# Patient Record
Sex: MALE | Race: OTHER | HISPANIC OR LATINO
[De-identification: names, ages, dates, MRNs, and addresses within clinical notes are randomized per-mention and may not be internally consistent; named-entity substitution may affect disease eponyms.]

---

## 2020-09-01 ENCOUNTER — TRANSCRIPTION ENCOUNTER (OUTPATIENT)
Age: 70
End: 2020-09-01

## 2020-12-29 ENCOUNTER — OUTPATIENT (OUTPATIENT)
Dept: OUTPATIENT SERVICES | Facility: HOSPITAL | Age: 70
LOS: 1 days | End: 2020-12-29
Payer: MEDICARE

## 2020-12-29 DIAGNOSIS — Z20.828 CONTACT WITH AND (SUSPECTED) EXPOSURE TO OTHER VIRAL COMMUNICABLE DISEASES: ICD-10-CM

## 2020-12-29 LAB — SARS-COV-2 RNA SPEC QL NAA+PROBE: SIGNIFICANT CHANGE UP

## 2020-12-29 PROCEDURE — C9803: CPT

## 2020-12-29 PROCEDURE — U0003: CPT

## 2020-12-30 DIAGNOSIS — Z20.828 CONTACT WITH AND (SUSPECTED) EXPOSURE TO OTHER VIRAL COMMUNICABLE DISEASES: ICD-10-CM

## 2021-06-24 ENCOUNTER — NON-APPOINTMENT (OUTPATIENT)
Age: 71
End: 2021-06-24

## 2021-06-24 ENCOUNTER — APPOINTMENT (OUTPATIENT)
Dept: ORTHOPEDIC SURGERY | Facility: CLINIC | Age: 71
End: 2021-06-24
Payer: MEDICARE

## 2021-06-24 VITALS
DIASTOLIC BLOOD PRESSURE: 96 MMHG | HEIGHT: 68 IN | BODY MASS INDEX: 25.76 KG/M2 | HEART RATE: 88 BPM | SYSTOLIC BLOOD PRESSURE: 194 MMHG | WEIGHT: 170 LBS

## 2021-06-24 DIAGNOSIS — Z78.9 OTHER SPECIFIED HEALTH STATUS: ICD-10-CM

## 2021-06-24 DIAGNOSIS — Z82.49 FAMILY HISTORY OF ISCHEMIC HEART DISEASE AND OTHER DISEASES OF THE CIRCULATORY SYSTEM: ICD-10-CM

## 2021-06-24 PROCEDURE — 73560 X-RAY EXAM OF KNEE 1 OR 2: CPT | Mod: 50

## 2021-06-24 PROCEDURE — 99203 OFFICE O/P NEW LOW 30 MIN: CPT

## 2021-07-01 NOTE — PHYSICAL EXAM
[de-identified] : Right Knee: Range of Motion in Degrees	\par 	                  Claimant:	Normal:	\par Flexion Active	  90 	                135-degrees	\par Flexion Passive	  90	                135-degrees	\par Extension Active	  10	                0-5-degrees	\par Extension Passive	  10	                0-5-degrees	\par \par No weakness to flexion/extension.  No evidence of instability in the AP plane or varus or valgus stress.  Negative  Lachman.  Negative pivot shift.  Negative anterior drawer test.  Negative posterior drawer test.  Negative Ita.  Negative Apley grind.  No medial or lateral joint line tenderness.  Positive tenderness over the medial and lateral facet of the patella.  Positive patellofemoral crepitations.  No lateral tilting patella.  No patella apprehension.  Positive crepitation in the medial and lateral femoral condyle.  No proximal or distal swelling, edema or tenderness.  No gross motor or sensory deficits.  Mild intra-articular swelling.  2+ DP and PT pulses.  No varus or valgus malalignment.  Skin is intact.  No rashes, scars or lesions.  \par \par Left Knee: Range of Motion in Degrees	\par 	                  Claimant:	Normal:	\par Flexion Active	  90	                135-degrees	\par Flexion Passive	  90	                135-degrees	\par Extension Active	  10	                0-5-degrees	\par Extension Passive	  10	                0-5-degrees	\par \par No weakness to flexion/extension.  No evidence of instability in the AP plane or varus or valgus stress.  Negative  Lachman.  Negative pivot shift.  Negative anterior drawer test.  Negative posterior drawer test.  Negative Ita.  Negative Apley grind.  No medial or lateral joint line tenderness.  Positive tenderness over the medial and lateral facet of the patella.  Positive patellofemoral crepitations.  No lateral tilting patella.  No patella apprehension.  Positive crepitation in the medial and lateral femoral condyle.  No proximal or distal swelling, edema or tenderness.  No gross motor or sensory deficits.  Mild intra-articular swelling.  2+ DP and PT pulses.  No varus or valgus malalignment.  Skin is intact.  No rashes, scars or lesions. \par  [de-identified] : Gait and Station:  Ambulating with a slightly antalgic to antalgic gait.  Normal Station.  [de-identified] : Appearance:  Well developed, well-nourished male in no acute distress.\par   [de-identified] : Radiographs, one to two views of the right knee, show severe arthritis.\par Radiographs, one to two views of the left knee, show severe arthritis.\par Radiograph, one view AP standing of bilateral knees, shows severe arthritis.\par

## 2021-07-01 NOTE — DISCUSSION/SUMMARY
[de-identified] : At this time, due to osteoarthritis of bilateral knees, I recommended to start a course of physical therapy, topical anti-inflammatory cream and reassessment in 4-6 weeks.\par

## 2021-07-01 NOTE — HISTORY OF PRESENT ILLNESS
[8] : a current pain level of 8/10 [de-identified] : The patient comes in today with complaints of pain to his bilateral knees (right worse than left).  He states it has been going on for quite some time and he has not had any treatment.  The patient states the onset/injury occurred on 11/12/2020.  This injury is not work related or due to an automobile accident.  The patient states the pain is intermittent and constant. The patient describes the pain as sharp. [de-identified] : Bending and lying down [de-identified] : Rest and ice

## 2021-07-01 NOTE — ADDENDUM
[FreeTextEntry1] : This note was written by Selvin Reyez on 06/29/2021, acting as a scribe for Cipriano Lebron III, MD

## 2021-07-22 ENCOUNTER — APPOINTMENT (OUTPATIENT)
Dept: ORTHOPEDIC SURGERY | Facility: CLINIC | Age: 71
End: 2021-07-22
Payer: MEDICARE

## 2021-07-22 VITALS
WEIGHT: 170 LBS | HEIGHT: 68 IN | BODY MASS INDEX: 25.76 KG/M2 | SYSTOLIC BLOOD PRESSURE: 167 MMHG | DIASTOLIC BLOOD PRESSURE: 91 MMHG | HEART RATE: 76 BPM

## 2021-07-22 PROCEDURE — 20610 DRAIN/INJ JOINT/BURSA W/O US: CPT | Mod: 50

## 2021-07-26 NOTE — PROCEDURE
[de-identified] : Consent: \par At this time, I have recommended an injection to the right and left knee.  The risks and benefits of the procedure were discussed with the patient in detail.  Upon verbal consent of the patient, we proceeded with the injection as noted below.  \par \par Procedure:  \par After a sterile prep, the patient underwent an injection of 9 cc of 1% Lidocaine without epinephrine and 1 cc of Kenalog into the right and left knee.  The patient tolerated the procedure well.  There were no complications.  \par

## 2021-07-26 NOTE — DISCUSSION/SUMMARY
[de-identified] : At this time, due to osteoarthritis of bilateral knees, I recommend ice and elevation.  He will be reassessed in three to four weeks.\par \par The patient has been advised that their blood pressure today was outside normal ranges and the patient was instructed accordingly. Our Blood Pressure Monitoring Sheet with instructions was given to the patient.\par

## 2021-07-26 NOTE — PHYSICAL EXAM
[de-identified] : Right Knee: \par Range of Motion in Degrees	\par 	  Claimant:	Normal:	\par Flexion Active	 90 	 135-degrees	\par Flexion Passive	 90	 135-degrees	\par Extension Active	 10	 0-5-degrees	\par Extension Passive	 10	 0-5-degrees	\par \par No weakness to flexion/extension. No evidence of instability in the AP plane or varus or valgus stress. Negative Lachman. Negative pivot shift. Negative anterior drawer test. Negative posterior drawer test. Negative Ita. Negative Apley grind. No medial or lateral joint line tenderness. Positive tenderness over the medial and lateral facet of the patella. Positive patellofemoral crepitations. No lateral tilting patella. No patella apprehension. Positive crepitation in the medial and lateral femoral condyle. No proximal or distal swelling, edema or tenderness. No gross motor or sensory deficits. Mild intra-articular swelling. 2+ DP and PT pulses. No varus or valgus malalignment. Skin is intact. No rashes, scars or lesions. \par \par Left Knee: \par Range of Motion in Degrees	\par 	  Claimant:	Normal:	\par Flexion Active	 90	 135-degrees	\par Flexion Passive	 90	 135-degrees	\par Extension Active	 10	 0-5-degrees	\par Extension Passive	 10	 0-5-degrees	\par \par No weakness to flexion/extension. No evidence of instability in the AP plane or varus or valgus stress. Negative Lachman. Negative pivot shift. Negative anterior drawer test. Negative posterior drawer test. Negative Ita. Negative Apley grind. No medial or lateral joint line tenderness. Positive tenderness over the medial and lateral facet of the patella. Positive patellofemoral crepitations. No lateral tilting patella. No patella apprehension. Positive crepitation in the medial and lateral femoral condyle. No proximal or distal swelling, edema or tenderness. No gross motor or sensory deficits. Mild intra-articular swelling. 2+ DP and PT pulses. No varus or valgus malalignment. Skin is intact. No rashes, scars or lesions. \par  [de-identified] : Ambulating with a slightly antalgic to antalgic gait.  Station:  Normal.  [de-identified] : Appearance:  Well-developed, well-nourished male in no acute distress.\par   [de-identified] : R

## 2021-07-26 NOTE — ADDENDUM
[FreeTextEntry1] : This note was written by Charlee Renee on 07/26/2021 acting as a scribe for BINDU BARONE III, MD

## 2021-08-12 ENCOUNTER — APPOINTMENT (OUTPATIENT)
Dept: ORTHOPEDIC SURGERY | Facility: CLINIC | Age: 71
End: 2021-08-12
Payer: MEDICARE

## 2021-08-12 VITALS
DIASTOLIC BLOOD PRESSURE: 86 MMHG | BODY MASS INDEX: 25.76 KG/M2 | HEIGHT: 68 IN | SYSTOLIC BLOOD PRESSURE: 156 MMHG | HEART RATE: 91 BPM | WEIGHT: 170 LBS

## 2021-08-12 PROCEDURE — 99212 OFFICE O/P EST SF 10 MIN: CPT

## 2021-08-13 ENCOUNTER — APPOINTMENT (OUTPATIENT)
Dept: INTERNAL MEDICINE | Facility: CLINIC | Age: 71
End: 2021-08-13

## 2021-08-15 ENCOUNTER — APPOINTMENT (OUTPATIENT)
Dept: DISASTER EMERGENCY | Facility: CLINIC | Age: 71
End: 2021-08-15

## 2021-08-15 DIAGNOSIS — Z01.818 ENCOUNTER FOR OTHER PREPROCEDURAL EXAMINATION: ICD-10-CM

## 2021-08-16 LAB — SARS-COV-2 N GENE NPH QL NAA+PROBE: NOT DETECTED

## 2021-08-17 NOTE — CHART NOTE - NSCHARTNOTEFT_GEN_A_CORE
Preop Phone Call:  - Able to Reach Patient:  yes  - Info given to: Tenisha granddaughter of patient having cardiac catheterization  -  and allergies confirmed: yes  - Medication Information Given: yes  - Medications To Take (specify) aspirin/ Amlodipine Ok to take a.m. meds w/sip of water  - Medications To Hold (Specify) N/A 	  - Arrival Time: 12:30pm  - NPO after: light breakfast 	  - Understanding of Information Verbalized: yes  will have a  over the age of 18  for d/c home

## 2021-08-17 NOTE — H&P ADULT - ASSESSMENT
70yr old male PMH HTN, arthritis went to his PMD for knee pain there he was found to be significantly hypertensive. Pt. was referred to cardiologist where a stress test revealed moderately sized perfusion abnormality of moderate severity present in the mid inferior and apical regions. Pt. now referred for C for further evaluation.    C risks, benefits and alternatives discussed with patient. Risk discussed included, but not limited to MI, stroke, mortality, major bleeding, arrythmia, or infection. Consent obtained and signed educational material provided. Pt. verbalizes and understands pre-procedural instructions.  ASA: II  Bleeding Risk Score: 1.4  Creatinine: .86  GFR: 102

## 2021-08-17 NOTE — H&P ADULT - HISTORY OF PRESENT ILLNESS
70yr old male PMH HTN, arthritis  70yr old male PMH HTN, arthritis went to his PMD for knee pain there he was found to be significantly hypertensive. Pt. was referred to cardiologist where a stress test revealed moderately sized perfusion abnormality of moderate severity present in the mid inferior and apical regions. Pt. now referred for [facility] for further evaluation.

## 2021-08-18 ENCOUNTER — OUTPATIENT (OUTPATIENT)
Dept: OUTPATIENT SERVICES | Facility: HOSPITAL | Age: 71
LOS: 1 days | Discharge: HOME | End: 2021-08-18
Payer: MEDICARE

## 2021-08-18 VITALS
OXYGEN SATURATION: 97 % | DIASTOLIC BLOOD PRESSURE: 80 MMHG | HEART RATE: 65 BPM | SYSTOLIC BLOOD PRESSURE: 136 MMHG | RESPIRATION RATE: 16 BRPM

## 2021-08-18 VITALS
RESPIRATION RATE: 16 BRPM | HEIGHT: 78 IN | SYSTOLIC BLOOD PRESSURE: 159 MMHG | DIASTOLIC BLOOD PRESSURE: 89 MMHG | TEMPERATURE: 98 F | WEIGHT: 203.05 LBS | OXYGEN SATURATION: 100 % | HEART RATE: 72 BPM

## 2021-08-18 DIAGNOSIS — I10 ESSENTIAL (PRIMARY) HYPERTENSION: ICD-10-CM

## 2021-08-18 DIAGNOSIS — M19.90 UNSPECIFIED OSTEOARTHRITIS, UNSPECIFIED SITE: ICD-10-CM

## 2021-08-18 DIAGNOSIS — R94.39 ABNORMAL RESULT OF OTHER CARDIOVASCULAR FUNCTION STUDY: ICD-10-CM

## 2021-08-18 DIAGNOSIS — I25.10 ATHEROSCLEROTIC HEART DISEASE OF NATIVE CORONARY ARTERY WITHOUT ANGINA PECTORIS: ICD-10-CM

## 2021-08-18 PROCEDURE — 93458 L HRT ARTERY/VENTRICLE ANGIO: CPT

## 2021-08-18 PROCEDURE — C1887: CPT

## 2021-08-18 PROCEDURE — C1894: CPT

## 2021-08-18 PROCEDURE — 93005 ELECTROCARDIOGRAM TRACING: CPT

## 2021-08-18 PROCEDURE — 99152 MOD SED SAME PHYS/QHP 5/>YRS: CPT

## 2021-08-18 PROCEDURE — C1769: CPT

## 2021-08-18 PROCEDURE — 93567 NJX CAR CTH SPRVLV AORTGRPHY: CPT

## 2021-08-18 PROCEDURE — 99153 MOD SED SAME PHYS/QHP EA: CPT

## 2021-08-18 NOTE — PACU DISCHARGE NOTE - COMMENTS
pt to be d/julia to home , vss, iv d/julia , pt without c/o discomfort .d/c teaching done with teachback

## 2021-08-18 NOTE — PROGRESS NOTE ADULT - SUBJECTIVE AND OBJECTIVE BOX
s/p LHC revealing non obstructive CAD    Denies chest pain, shortness of breath, dizziness or palpitations at this time  A+Ox4  CV:S1S2 reg  Respiratory: CTAB  Right radial hemoband removed.  Procedure site CDI, no bleeding, no hematoma, able to move all digits with capillary refill < 3 seconds, fingers warm    T(C): 36.8 (18 Aug 2021 12:57), Max: 36.8 (18 Aug 2021 12:57)  T(F): 98.3 (18 Aug 2021 12:57), Max: 98.3 (18 Aug 2021 12:57)  HR: 65 (18 Aug 2021 16:10) (62 - 72)  BP: 136/80 (18 Aug 2021 16:10) (134/81 - 159/89)  BP(mean): --  RR: 16 (18 Aug 2021 16:10) (16 - 16)  SpO2: 97% (18 Aug 2021 16:10) (97% - 100%)      HPI:  70yr old male PMH HTN, arthritis went to his PMD for knee pain there he was found to be significantly hypertensive. Pt. was referred to cardiologist where a stress test revealed moderately sized perfusion abnormality of moderate severity present in the mid inferior and apical regions. Pt. now referred for C for further evaluation.   (17 Aug 2021 15:33)    s/p LHC revealing non obstructive CAD    --vital signs, diet and activity per post procedure orders  -ambulate ad darby post bedrest  -encourage PO fluids  -plan of care discussed with patient and MD  -d/c after bedrest if stable  -post procedure and d/c instructions reviewed  -follow up with MD in 1-2 weeks  -Discussed therapeutic lifestyle changes to reduce risk factors such as following a cardiac diet, weight loss, maintaining a healthy weight, exercise, smoking cessation, medication compliance, and regular follow-up  with MD

## 2021-08-26 NOTE — DISCUSSION/SUMMARY
[de-identified] : At this time, he was instructed on home therapeutic modalities for the left and right knee osteoarthritis.  He will follow up in four to six weeks.

## 2021-08-26 NOTE — PHYSICAL EXAM
[de-identified] : Right Knee: \par Range of Motion in Degrees	\par 	                  Claimant:	Normal:	\par Flexion Active	  135 	                135-degrees	\par Flexion Passive	  135	                135-degrees	\par Extension Active	  0-5	                0-5-degrees	\par Extension Passive	  0-5	                0-5-degrees	\par \par No weakness to flexion/extension. No evidence of instability in the AP plane or varus or valgus stress.  Negative  Lachman.  Negative pivot shift.  Negative anterior drawer test.  Negative posterior drawer test.  Negative Ita.  Negative Apley grind.  No medial or lateral joint line tenderness.  Positive tenderness over the medial and lateral facet of the patella.  Positive patellofemoral crepitations.  No lateral tilting patella.  No patella apprehension.  Positive crepitation in the medial and lateral femoral condyle.  No proximal or distal swelling, edema or tenderness.  No gross motor or sensory deficits. Mild intra-articular swelling.  2+ DP and PT pulses. No varus or valgus malalignment.  Skin is intact.  No rashes, scars or lesions. \par \par Left Knee: \par Range of Motion in Degrees	\par 	                  Claimant:	Normal:	\par Flexion Active	  135 	                135-degrees	\par Flexion Passive	  135	                135-degrees	\par Extension Active	  0-5	                0-5-degrees	\par Extension Passive	  0-5	                0-5-degrees	\par \par No weakness to flexion/extension. No evidence of instability in the AP plane or varus or valgus stress.  Negative  Lachman.  Negative pivot shift.  Negative anterior drawer test.  Negative posterior drawer test.  Negative Ita.  Negative Apley grind.  No medial or lateral joint line tenderness.  Positive tenderness over the medial and lateral facet of the patella.  Positive patellofemoral crepitations.  No lateral tilting patella.  No patella apprehension.  Positive crepitation in the medial and lateral femoral condyle.  No proximal or distal swelling, edema or tenderness.  No gross motor or sensory deficits. Mild intra-articular swelling.  2+ DP and PT pulses. No varus or valgus malalignment.  Skin is intact.  No rashes, scars or lesions. \par  [de-identified] : Ambulating with a normal gait.  Station:  Normal.  [de-identified] : General Appearance:  Well-developed, well-nourished male in no acute distress.

## 2021-08-26 NOTE — ADDENDUM
[FreeTextEntry1] : This note was written by Bri Lester on 08/20/2021 acting as scribe for Cipriano Lebron III, MD

## 2021-09-16 ENCOUNTER — APPOINTMENT (OUTPATIENT)
Dept: ORTHOPEDIC SURGERY | Facility: CLINIC | Age: 71
End: 2021-09-16
Payer: MEDICARE

## 2021-09-16 PROCEDURE — 99441: CPT

## 2021-10-26 PROBLEM — I10 ESSENTIAL (PRIMARY) HYPERTENSION: Chronic | Status: ACTIVE | Noted: 2021-08-17

## 2021-10-26 PROBLEM — M19.90 UNSPECIFIED OSTEOARTHRITIS, UNSPECIFIED SITE: Chronic | Status: ACTIVE | Noted: 2021-08-17

## 2021-11-02 ENCOUNTER — APPOINTMENT (OUTPATIENT)
Dept: UROLOGY | Facility: CLINIC | Age: 71
End: 2021-11-02
Payer: MEDICARE

## 2021-11-02 PROCEDURE — 99204 OFFICE O/P NEW MOD 45 MIN: CPT

## 2021-11-02 NOTE — PHYSICAL EXAM
[General Appearance - Well Developed] : well developed [General Appearance - Well Nourished] : well nourished [Normal Appearance] : normal appearance [Well Groomed] : well groomed [General Appearance - In No Acute Distress] : no acute distress [Edema] : no peripheral edema [Respiration, Rhythm And Depth] : normal respiratory rhythm and effort [Exaggerated Use Of Accessory Muscles For Inspiration] : no accessory muscle use [Abdomen Soft] : soft [Abdomen Tenderness] : non-tender [Costovertebral Angle Tenderness] : no ~M costovertebral angle tenderness [Urethral Meatus] : meatus normal [Urinary Bladder Findings] : the bladder was normal on palpation [Scrotum] : the scrotum was normal [Testes Mass (___cm)] : there were no testicular masses [No Prostate Nodules] : no prostate nodules [FreeTextEntry1] : The prostate gland is palpably benign and moderate in size [Normal Station and Gait] : the gait and station were normal for the patient's age [] : no rash [No Focal Deficits] : no focal deficits [Oriented To Time, Place, And Person] : oriented to person, place, and time [Affect] : the affect was normal [Mood] : the mood was normal [Not Anxious] : not anxious [No Palpable Adenopathy] : no palpable adenopathy

## 2021-11-02 NOTE — END OF VISIT
[FreeTextEntry3] : He will take a course of antibiotics followed by a repeat PSA and transrectal ultrasound of the prostate gland.

## 2021-11-02 NOTE — HISTORY OF PRESENT ILLNESS
[FreeTextEntry1] : This patient presents for evaluation of a PSA of over 19.  He has some minor urinary complaints but otherwise has been free of genitourinary problems in the past.  His daughter is with him

## 2021-11-23 ENCOUNTER — APPOINTMENT (OUTPATIENT)
Dept: UROLOGY | Facility: CLINIC | Age: 71
End: 2021-11-23
Payer: MEDICARE

## 2021-11-23 VITALS
DIASTOLIC BLOOD PRESSURE: 79 MMHG | OXYGEN SATURATION: 97 % | WEIGHT: 185 LBS | BODY MASS INDEX: 29.73 KG/M2 | HEIGHT: 66 IN | HEART RATE: 91 BPM | SYSTOLIC BLOOD PRESSURE: 169 MMHG

## 2021-11-23 DIAGNOSIS — N40.0 BENIGN PROSTATIC HYPERPLASIA WITHOUT LOWER URINARY TRACT SYMPMS: ICD-10-CM

## 2021-11-23 PROCEDURE — 76872 US TRANSRECTAL: CPT

## 2021-11-26 LAB — PSA SERPL-MCNC: 22.1 NG/ML

## 2021-12-17 LAB
ANION GAP SERPL CALC-SCNC: 13 MMOL/L
BUN SERPL-MCNC: 14 MG/DL
CALCIUM SERPL-MCNC: 9.3 MG/DL
CHLORIDE SERPL-SCNC: 103 MMOL/L
CO2 SERPL-SCNC: 25 MMOL/L
CREAT SERPL-MCNC: 0.81 MG/DL
GLUCOSE SERPL-MCNC: 104 MG/DL
POTASSIUM SERPL-SCNC: 4.2 MMOL/L
SODIUM SERPL-SCNC: 141 MMOL/L

## 2022-01-06 ENCOUNTER — RESULT REVIEW (OUTPATIENT)
Age: 72
End: 2022-01-06

## 2022-01-06 ENCOUNTER — APPOINTMENT (OUTPATIENT)
Dept: MRI IMAGING | Facility: CLINIC | Age: 72
End: 2022-01-06
Payer: MEDICARE

## 2022-01-06 ENCOUNTER — OUTPATIENT (OUTPATIENT)
Dept: OUTPATIENT SERVICES | Facility: HOSPITAL | Age: 72
LOS: 1 days | End: 2022-01-06
Payer: MEDICARE

## 2022-01-06 DIAGNOSIS — R97.20 ELEVATED PROSTATE SPECIFIC ANTIGEN [PSA]: ICD-10-CM

## 2022-01-06 PROCEDURE — 76377 3D RENDER W/INTRP POSTPROCES: CPT | Mod: 26

## 2022-01-06 PROCEDURE — 76377 3D RENDER W/INTRP POSTPROCES: CPT

## 2022-01-06 PROCEDURE — 72197 MRI PELVIS W/O & W/DYE: CPT | Mod: 26

## 2022-01-06 PROCEDURE — 72197 MRI PELVIS W/O & W/DYE: CPT

## 2022-01-06 PROCEDURE — A9585: CPT

## 2022-01-20 ENCOUNTER — NON-APPOINTMENT (OUTPATIENT)
Age: 72
End: 2022-01-20

## 2022-01-20 ENCOUNTER — TRANSCRIPTION ENCOUNTER (OUTPATIENT)
Age: 72
End: 2022-01-20

## 2022-01-21 DIAGNOSIS — R97.20 ELEVATED PROSTATE, SPECIFIC ANTIGEN [PSA]: ICD-10-CM

## 2022-02-10 PROBLEM — R97.20 PSA ELEVATION: Status: ACTIVE | Noted: 2021-11-02

## 2022-02-14 ENCOUNTER — NON-APPOINTMENT (OUTPATIENT)
Age: 72
End: 2022-02-14

## 2022-02-15 ENCOUNTER — NON-APPOINTMENT (OUTPATIENT)
Age: 72
End: 2022-02-15

## 2022-02-16 ENCOUNTER — APPOINTMENT (OUTPATIENT)
Dept: UROLOGY | Facility: CLINIC | Age: 72
End: 2022-02-16
Payer: MEDICARE

## 2022-02-16 VITALS
HEART RATE: 84 BPM | SYSTOLIC BLOOD PRESSURE: 145 MMHG | BODY MASS INDEX: 29.73 KG/M2 | HEIGHT: 66 IN | OXYGEN SATURATION: 97 % | DIASTOLIC BLOOD PRESSURE: 87 MMHG | WEIGHT: 185 LBS

## 2022-02-16 PROCEDURE — 76942 ECHO GUIDE FOR BIOPSY: CPT | Mod: 59

## 2022-02-16 PROCEDURE — 76872 US TRANSRECTAL: CPT

## 2022-02-16 PROCEDURE — 55700: CPT | Mod: 22

## 2022-02-16 PROCEDURE — 76377 3D RENDER W/INTRP POSTPROCES: CPT

## 2022-02-19 LAB — CORE LAB BIOPSY: NORMAL

## 2022-03-02 ENCOUNTER — APPOINTMENT (OUTPATIENT)
Dept: UROLOGY | Facility: CLINIC | Age: 72
End: 2022-03-02
Payer: MEDICARE

## 2022-03-02 PROCEDURE — 99214 OFFICE O/P EST MOD 30 MIN: CPT

## 2022-03-06 NOTE — ASSESSMENT
[FreeTextEntry1] : Pathology report:\par GS 7 (4+3) x 1 core\par GS 7 (3+4) x 4 cores\par GS 6 (3+3) x 3 cores\par \par Discussed treatment options such as radiation and surgery. Reviewed both options with patient. \par Radiation explained inclusive of hormonal therapy to patient and his granddaughter. Potential complications such as\par cystitis, proctitis, impotence and incontinence. Cyberknife also discussed with patient.\par \par Surgery - robotic radical prostatectomy discussed with patient. Pre, intra and post-operative procedures and protocols explained. Potential complications such as bleeding, infection, injury to surrounding organs, impotence and incontinence explained to patient. Nerve sparing technique discussed with patient.\par \par Referral for radiation oncologist given to patient at the time of visit.\par CT scan abdomen and pelvis ordered.\par NM bone scan ordered.\par Answered all questions and addressed all concerns.\par Instructed patient to return to office once the above are completed to discuss final choice for treatment of his prostate cancer.

## 2022-03-06 NOTE — HISTORY OF PRESENT ILLNESS
[None] : no symptoms [FreeTextEntry1] : 72 yo presents today for a follow-up appointment for his prostate biopsy results performed on 2.16.22.\par PSA 22.10 (1.14.22.)\par MRI prostate 1.6.22. PV 59 cc. PIRADS 5 x 2 lesions and PIRADS 4 x 1 lesion.\par Pt. tolerated procedure well and w/o any complications. \par Here to discuss his pathology report.

## 2022-03-29 ENCOUNTER — OUTPATIENT (OUTPATIENT)
Dept: OUTPATIENT SERVICES | Facility: HOSPITAL | Age: 72
LOS: 1 days | End: 2022-03-29
Payer: MEDICARE

## 2022-03-29 ENCOUNTER — APPOINTMENT (OUTPATIENT)
Dept: CT IMAGING | Facility: CLINIC | Age: 72
End: 2022-03-29
Payer: MEDICARE

## 2022-03-29 DIAGNOSIS — C61 MALIGNANT NEOPLASM OF PROSTATE: ICD-10-CM

## 2022-03-29 PROCEDURE — 74178 CT ABD&PLV WO CNTR FLWD CNTR: CPT

## 2022-03-29 PROCEDURE — 74178 CT ABD&PLV WO CNTR FLWD CNTR: CPT | Mod: 26

## 2022-04-03 ENCOUNTER — NON-APPOINTMENT (OUTPATIENT)
Age: 72
End: 2022-04-03

## 2022-04-13 ENCOUNTER — APPOINTMENT (OUTPATIENT)
Dept: NUCLEAR MEDICINE | Facility: IMAGING CENTER | Age: 72
End: 2022-04-13
Payer: MEDICARE

## 2022-04-13 ENCOUNTER — OUTPATIENT (OUTPATIENT)
Dept: OUTPATIENT SERVICES | Facility: HOSPITAL | Age: 72
LOS: 1 days | End: 2022-04-13
Payer: MEDICARE

## 2022-04-13 DIAGNOSIS — Z00.8 ENCOUNTER FOR OTHER GENERAL EXAMINATION: ICD-10-CM

## 2022-04-13 DIAGNOSIS — C61 MALIGNANT NEOPLASM OF PROSTATE: ICD-10-CM

## 2022-04-13 PROCEDURE — 78306 BONE IMAGING WHOLE BODY: CPT | Mod: 26

## 2022-04-13 PROCEDURE — A9561: CPT

## 2022-04-13 PROCEDURE — 78306 BONE IMAGING WHOLE BODY: CPT

## 2022-04-14 ENCOUNTER — NON-APPOINTMENT (OUTPATIENT)
Age: 72
End: 2022-04-14

## 2022-04-20 ENCOUNTER — APPOINTMENT (OUTPATIENT)
Dept: UROLOGY | Facility: CLINIC | Age: 72
End: 2022-04-20
Payer: MEDICARE

## 2022-04-20 PROCEDURE — 99214 OFFICE O/P EST MOD 30 MIN: CPT

## 2022-04-27 ENCOUNTER — RESULT REVIEW (OUTPATIENT)
Age: 72
End: 2022-04-27

## 2022-04-27 ENCOUNTER — OUTPATIENT (OUTPATIENT)
Dept: OUTPATIENT SERVICES | Facility: HOSPITAL | Age: 72
LOS: 1 days | End: 2022-04-27
Payer: MEDICARE

## 2022-04-27 DIAGNOSIS — C61 MALIGNANT NEOPLASM OF PROSTATE: ICD-10-CM

## 2022-04-27 PROCEDURE — 88321 CONSLTJ&REPRT SLD PREP ELSWR: CPT

## 2022-04-27 NOTE — ASSESSMENT
[FreeTextEntry1] : Reviewed both treatment options for prostate cancer.\par Radiation and surgery both explained to patient. \par Again, he is not convinced that he has cancer. \par He is concerned for incontinence and having a catheter.\par After a lengthy discussion, pt. now understands the current situation. It was explained to him that if he was symptomatic for prostate cancer, it would be of an advanced stage. \par Granddaughter present for the visit who translated in Finnish to patient. \par Finnish surgical consent signed at time of visit. \par Pre, intra and post-operative procedures and protocols explained to patient.\par Answered all questions and addressed all concerns. \par Will schedule patient as planned.\par 30 minute discussion regarding options\par He is unsure and will get back to us

## 2022-04-27 NOTE — HISTORY OF PRESENT ILLNESS
[None] : no symptoms [FreeTextEntry1] : 70 yo presents today for a follow-up appointment for prostate cancer.\par Recent CT scan and NM bone scan results discussed with patient. \par All results are negative.\par Pt. is not convinced that he has prostate cancer. He is asymptomatic and he is doubtful. He does not want to seek treatment.

## 2022-04-28 DIAGNOSIS — C61 MALIGNANT NEOPLASM OF PROSTATE: ICD-10-CM

## 2022-05-06 ENCOUNTER — OUTPATIENT (OUTPATIENT)
Dept: OUTPATIENT SERVICES | Facility: HOSPITAL | Age: 72
LOS: 1 days | End: 2022-05-06
Payer: MEDICARE

## 2022-05-06 ENCOUNTER — RESULT REVIEW (OUTPATIENT)
Age: 72
End: 2022-05-06

## 2022-05-06 VITALS
TEMPERATURE: 99 F | HEIGHT: 67 IN | RESPIRATION RATE: 16 BRPM | DIASTOLIC BLOOD PRESSURE: 78 MMHG | WEIGHT: 210.1 LBS | SYSTOLIC BLOOD PRESSURE: 135 MMHG | HEART RATE: 88 BPM

## 2022-05-06 DIAGNOSIS — Z98.890 OTHER SPECIFIED POSTPROCEDURAL STATES: Chronic | ICD-10-CM

## 2022-05-06 DIAGNOSIS — Z01.818 ENCOUNTER FOR OTHER PREPROCEDURAL EXAMINATION: ICD-10-CM

## 2022-05-06 DIAGNOSIS — C61 MALIGNANT NEOPLASM OF PROSTATE: ICD-10-CM

## 2022-05-06 DIAGNOSIS — Z91.89 OTHER SPECIFIED PERSONAL RISK FACTORS, NOT ELSEWHERE CLASSIFIED: ICD-10-CM

## 2022-05-06 DIAGNOSIS — I10 ESSENTIAL (PRIMARY) HYPERTENSION: ICD-10-CM

## 2022-05-06 LAB
ANION GAP SERPL CALC-SCNC: 8 MMOL/L — SIGNIFICANT CHANGE UP (ref 5–17)
APPEARANCE UR: CLEAR — SIGNIFICANT CHANGE UP
APTT BLD: 27.7 SEC — SIGNIFICANT CHANGE UP (ref 27.5–35.5)
BASOPHILS # BLD AUTO: 0.01 K/UL — SIGNIFICANT CHANGE UP (ref 0–0.2)
BASOPHILS NFR BLD AUTO: 0.1 % — SIGNIFICANT CHANGE UP (ref 0–2)
BILIRUB UR-MCNC: NEGATIVE — SIGNIFICANT CHANGE UP
BUN SERPL-MCNC: 16 MG/DL — SIGNIFICANT CHANGE UP (ref 7–23)
CALCIUM SERPL-MCNC: 9.3 MG/DL — SIGNIFICANT CHANGE UP (ref 8.5–10.1)
CHLORIDE SERPL-SCNC: 108 MMOL/L — SIGNIFICANT CHANGE UP (ref 96–108)
CO2 SERPL-SCNC: 25 MMOL/L — SIGNIFICANT CHANGE UP (ref 22–31)
COLOR SPEC: YELLOW — SIGNIFICANT CHANGE UP
CREAT SERPL-MCNC: 1.16 MG/DL — SIGNIFICANT CHANGE UP (ref 0.5–1.3)
DIFF PNL FLD: ABNORMAL
EGFR: 67 ML/MIN/1.73M2 — SIGNIFICANT CHANGE UP
EOSINOPHIL # BLD AUTO: 0.92 K/UL — HIGH (ref 0–0.5)
EOSINOPHIL NFR BLD AUTO: 11.7 % — HIGH (ref 0–6)
GLUCOSE SERPL-MCNC: 138 MG/DL — HIGH (ref 70–99)
GLUCOSE UR QL: NEGATIVE — SIGNIFICANT CHANGE UP
HCT VFR BLD CALC: 38.2 % — LOW (ref 39–50)
HGB BLD-MCNC: 13.2 G/DL — SIGNIFICANT CHANGE UP (ref 13–17)
IMM GRANULOCYTES NFR BLD AUTO: 0.3 % — SIGNIFICANT CHANGE UP (ref 0–1.5)
INR BLD: 0.99 RATIO — SIGNIFICANT CHANGE UP (ref 0.88–1.16)
KETONES UR-MCNC: NEGATIVE — SIGNIFICANT CHANGE UP
LEUKOCYTE ESTERASE UR-ACNC: NEGATIVE — SIGNIFICANT CHANGE UP
LYMPHOCYTES # BLD AUTO: 1.95 K/UL — SIGNIFICANT CHANGE UP (ref 1–3.3)
LYMPHOCYTES # BLD AUTO: 24.8 % — SIGNIFICANT CHANGE UP (ref 13–44)
MCHC RBC-ENTMCNC: 28.6 PG — SIGNIFICANT CHANGE UP (ref 27–34)
MCHC RBC-ENTMCNC: 34.6 GM/DL — SIGNIFICANT CHANGE UP (ref 32–36)
MCV RBC AUTO: 82.9 FL — SIGNIFICANT CHANGE UP (ref 80–100)
MONOCYTES # BLD AUTO: 0.76 K/UL — SIGNIFICANT CHANGE UP (ref 0–0.9)
MONOCYTES NFR BLD AUTO: 9.7 % — SIGNIFICANT CHANGE UP (ref 2–14)
NEUTROPHILS # BLD AUTO: 4.21 K/UL — SIGNIFICANT CHANGE UP (ref 1.8–7.4)
NEUTROPHILS NFR BLD AUTO: 53.4 % — SIGNIFICANT CHANGE UP (ref 43–77)
NITRITE UR-MCNC: NEGATIVE — SIGNIFICANT CHANGE UP
PH UR: 6 — SIGNIFICANT CHANGE UP (ref 5–8)
PLATELET # BLD AUTO: 212 K/UL — SIGNIFICANT CHANGE UP (ref 150–400)
POTASSIUM SERPL-MCNC: 3.9 MMOL/L — SIGNIFICANT CHANGE UP (ref 3.5–5.3)
POTASSIUM SERPL-SCNC: 3.9 MMOL/L — SIGNIFICANT CHANGE UP (ref 3.5–5.3)
PROT UR-MCNC: NEGATIVE — SIGNIFICANT CHANGE UP
PROTHROM AB SERPL-ACNC: 11.5 SEC — SIGNIFICANT CHANGE UP (ref 10.5–13.4)
RBC # BLD: 4.61 M/UL — SIGNIFICANT CHANGE UP (ref 4.2–5.8)
RBC # FLD: 13 % — SIGNIFICANT CHANGE UP (ref 10.3–14.5)
SODIUM SERPL-SCNC: 141 MMOL/L — SIGNIFICANT CHANGE UP (ref 135–145)
SP GR SPEC: 1.02 — SIGNIFICANT CHANGE UP (ref 1.01–1.02)
UROBILINOGEN FLD QL: 1
WBC # BLD: 7.87 K/UL — SIGNIFICANT CHANGE UP (ref 3.8–10.5)
WBC # FLD AUTO: 7.87 K/UL — SIGNIFICANT CHANGE UP (ref 3.8–10.5)

## 2022-05-06 PROCEDURE — 71046 X-RAY EXAM CHEST 2 VIEWS: CPT

## 2022-05-06 PROCEDURE — 85025 COMPLETE CBC W/AUTO DIFF WBC: CPT

## 2022-05-06 PROCEDURE — 80048 BASIC METABOLIC PNL TOTAL CA: CPT

## 2022-05-06 PROCEDURE — 86901 BLOOD TYPING SEROLOGIC RH(D): CPT

## 2022-05-06 PROCEDURE — 71046 X-RAY EXAM CHEST 2 VIEWS: CPT | Mod: 26

## 2022-05-06 PROCEDURE — 85730 THROMBOPLASTIN TIME PARTIAL: CPT

## 2022-05-06 PROCEDURE — 93010 ELECTROCARDIOGRAM REPORT: CPT

## 2022-05-06 PROCEDURE — 36415 COLL VENOUS BLD VENIPUNCTURE: CPT

## 2022-05-06 PROCEDURE — 93005 ELECTROCARDIOGRAM TRACING: CPT

## 2022-05-06 PROCEDURE — 85610 PROTHROMBIN TIME: CPT

## 2022-05-06 PROCEDURE — 81001 URINALYSIS AUTO W/SCOPE: CPT

## 2022-05-06 PROCEDURE — G0463: CPT | Mod: 25

## 2022-05-06 PROCEDURE — 87086 URINE CULTURE/COLONY COUNT: CPT

## 2022-05-06 PROCEDURE — 86850 RBC ANTIBODY SCREEN: CPT

## 2022-05-06 PROCEDURE — 86900 BLOOD TYPING SEROLOGIC ABO: CPT

## 2022-05-06 RX ORDER — ASPIRIN/CALCIUM CARB/MAGNESIUM 324 MG
0 TABLET ORAL
Qty: 0 | Refills: 0 | DISCHARGE

## 2022-05-06 NOTE — H&P PST ADULT - PROBLEM SELECTOR PLAN 1
Pre op instructions given and explained.  Avoid NSAIDs and OTC supplements.   Patient verbalized understanding  medical consult requested by surgeon 5/9/22  covid 19 swab scheduled  5/16/22 , advised to quarantine after test

## 2022-05-06 NOTE — H&P PST ADULT - NSANTHOSAYNRD_GEN_A_CORE
No. CANDI screening performed.  STOP BANG Legend: 0-2 = LOW Risk; 3-4 = INTERMEDIATE Risk; 5-8 = HIGH Risk

## 2022-05-06 NOTE — H&P PST ADULT - PROBLEM SELECTOR PLAN 2
On  the DOS  with sip of water take cardiac meds -amlodipine and losartan    Patient verbalized understanding.

## 2022-05-06 NOTE — H&P PST ADULT - HISTORY OF PRESENT ILLNESS
70 y/o Israeli speaking presents to PST accompanied by daughter for radical prostatectomy on 5/19/22. Patient with hx of HTn reports elevated PSA 4 months referred to urologist.

## 2022-05-06 NOTE — H&P PST ADULT - NSICDXPASTMEDICALHX_GEN_ALL_CORE_FT
PAST MEDICAL HISTORY:  Arthritis     At risk for obstructive sleep apnea CANDI precautions. Pt >3 criteria on STOP-Bang questionnaire.    HTN (hypertension)     Prostate CA

## 2022-05-06 NOTE — H&P PST ADULT - ASSESSMENT
72 y/o Swazi speaking presents to PST accompanied by daughter for radical prostatectomy on 5/19/22. Patient with hx of HTn reports elevated PSA 4 months referred to urologist.  72 y/o Romansh speaking presents to PST accompanied by daughter for radical prostatectomy on 22. Patient with hx of HTn reports elevated PSA 4 months referred to urologist.     CAPRINI SCORE    AGE RELATED RISK FACTORS                                                       MOBILITY RELATED FACTORS  [ ] Age 41-60 years                                            (1 Point)                  [ ] Bed rest                                                        (1 Point)  [x ] Age: 61-74 years                                           (2 Points)                [ ] Plaster cast                                                   (2 Points)  [ ] Age= 75 years                                              (3 Points)                 [ ] Bed bound for more than 72 hours                   (2 Points)    DISEASE RELATED RISK FACTORS                                               GENDER SPECIFIC FACTORS  [ ] Edema in the lower extremities                       (1 Point)                  [ ] Pregnancy                                                     (1 Point)  [ ] Varicose veins                                               (1 Point)                  [ ] Post-partum < 6 weeks                                   (1 Point)             [ x] BMI > 25 Kg/m2                                            (1 Point)                  [ ] Hormonal therapy  or oral contraception            (1 Point)                 [ ] Sepsis (in the previous month)                        (1 Point)                  [ ] History of pregnancy complications  [ ] Pneumonia or serious lung disease                                               [ ] Unexplained or recurrent                       (1 Point)           (in the previous month)                               (1 Point)  [ ] Abnormal pulmonary function test                     (1 Point)                 SURGERY RELATED RISK FACTORS  [ ] Acute myocardial infarction                              (1 Point)                 [ ]  Section                                            (1 Point)  [ ] Congestive heart failure (in the previous month)  (1 Point)                 [ ] Minor surgery                                                 (1 Point)   [ ] Inflammatory bowel disease                             (1 Point)                 [ ] Arthroscopic surgery                                        (2 Points)  [ ] Central venous access                                    (2 Points)                [x ] General surgery lasting more than 45 minutes   (2 Points)       [ ] Stroke (in the previous month)                          (5 Points)               [ ] Elective arthroplasty                                        (5 Points)            [ ] malignancy                                                             (2 points)                                                                                                                                 HEMATOLOGY RELATED FACTORS                                                 TRAUMA RELATED RISK FACTORS  [ ] Prior episodes of VTE                                     (3 Points)                 [ ] Fracture of the hip, pelvis, or leg                       (5 Points)  [ ] Positive family history for VTE                         (3 Points)                 [ ] Acute spinal cord injury (in the previous month)  (5 Points)  [ ] Prothrombin 28381 A                                      (3 Points)                 [ ] Paralysis  (less than 1 month)                          (5 Points)  [ ] Factor V Leiden                                             (3 Points)                 [ ] Multiple Trauma within 1 month                         (5 Points)  [ ] Lupus anticoagulants                                     (3 Points)                                                           [ ] Anticardiolipin antibodies                                (3 Points)                                                       [ ] High homocysteine in the blood                      (3 Points)                                             [ ] Other congenital or acquired thrombophilia       (3 Points)                                                [ ] Heparin induced thrombocytopenia                  (3 Points)                                          Total Score [    5      ]    The Caprini score indicates this patient is at risk for a VTE event (score 3-5).  Most surgical patients in this group would benefit from pharmacologic prophylaxis.  The surgical team will determine the balance between VTE risk and bleeding risk

## 2022-05-07 DIAGNOSIS — Z01.818 ENCOUNTER FOR OTHER PREPROCEDURAL EXAMINATION: ICD-10-CM

## 2022-05-07 DIAGNOSIS — C61 MALIGNANT NEOPLASM OF PROSTATE: ICD-10-CM

## 2022-05-07 LAB
CULTURE RESULTS: SIGNIFICANT CHANGE UP
SPECIMEN SOURCE: SIGNIFICANT CHANGE UP

## 2022-05-09 PROBLEM — C61 MALIGNANT NEOPLASM OF PROSTATE: Chronic | Status: ACTIVE | Noted: 2022-05-06

## 2022-05-09 PROBLEM — Z91.89 OTHER SPECIFIED PERSONAL RISK FACTORS, NOT ELSEWHERE CLASSIFIED: Chronic | Status: ACTIVE | Noted: 2022-05-06

## 2022-05-18 RX ORDER — SODIUM CHLORIDE 9 MG/ML
3 INJECTION INTRAMUSCULAR; INTRAVENOUS; SUBCUTANEOUS EVERY 8 HOURS
Refills: 0 | Status: DISCONTINUED | OUTPATIENT
Start: 2022-05-19 | End: 2022-05-20

## 2022-05-18 RX ORDER — OXYCODONE HYDROCHLORIDE 5 MG/1
5 TABLET ORAL ONCE
Refills: 0 | Status: DISCONTINUED | OUTPATIENT
Start: 2022-05-19 | End: 2022-05-19

## 2022-05-18 RX ORDER — PROCHLORPERAZINE MALEATE 5 MG
10 TABLET ORAL ONCE
Refills: 0 | Status: DISCONTINUED | OUTPATIENT
Start: 2022-05-19 | End: 2022-05-20

## 2022-05-18 RX ORDER — HYDROMORPHONE HYDROCHLORIDE 2 MG/ML
0.5 INJECTION INTRAMUSCULAR; INTRAVENOUS; SUBCUTANEOUS
Refills: 0 | Status: DISCONTINUED | OUTPATIENT
Start: 2022-05-19 | End: 2022-05-19

## 2022-05-18 RX ORDER — FENTANYL CITRATE 50 UG/ML
25 INJECTION INTRAVENOUS
Refills: 0 | Status: DISCONTINUED | OUTPATIENT
Start: 2022-05-19 | End: 2022-05-19

## 2022-05-18 RX ORDER — MEPERIDINE HYDROCHLORIDE 50 MG/ML
12.5 INJECTION INTRAMUSCULAR; INTRAVENOUS; SUBCUTANEOUS
Refills: 0 | Status: DISCONTINUED | OUTPATIENT
Start: 2022-05-19 | End: 2022-05-19

## 2022-05-18 RX ORDER — SODIUM CHLORIDE 9 MG/ML
1000 INJECTION, SOLUTION INTRAVENOUS
Refills: 0 | Status: DISCONTINUED | OUTPATIENT
Start: 2022-05-19 | End: 2022-05-19

## 2022-05-18 RX ORDER — ONDANSETRON 8 MG/1
4 TABLET, FILM COATED ORAL ONCE
Refills: 0 | Status: DISCONTINUED | OUTPATIENT
Start: 2022-05-19 | End: 2022-05-19

## 2022-05-19 ENCOUNTER — APPOINTMENT (OUTPATIENT)
Dept: UROLOGY | Facility: HOSPITAL | Age: 72
End: 2022-05-19
Payer: MEDICARE

## 2022-05-19 ENCOUNTER — RESULT REVIEW (OUTPATIENT)
Age: 72
End: 2022-05-19

## 2022-05-19 ENCOUNTER — OUTPATIENT (OUTPATIENT)
Dept: INPATIENT UNIT | Facility: HOSPITAL | Age: 72
LOS: 1 days | Discharge: HOME | End: 2022-05-19
Payer: MEDICARE

## 2022-05-19 VITALS
HEIGHT: 67 IN | HEART RATE: 83 BPM | DIASTOLIC BLOOD PRESSURE: 93 MMHG | SYSTOLIC BLOOD PRESSURE: 145 MMHG | RESPIRATION RATE: 15 BRPM | WEIGHT: 210.1 LBS | OXYGEN SATURATION: 100 % | TEMPERATURE: 98 F

## 2022-05-19 DIAGNOSIS — C61 MALIGNANT NEOPLASM OF PROSTATE: ICD-10-CM

## 2022-05-19 DIAGNOSIS — I10 ESSENTIAL (PRIMARY) HYPERTENSION: ICD-10-CM

## 2022-05-19 DIAGNOSIS — M19.90 UNSPECIFIED OSTEOARTHRITIS, UNSPECIFIED SITE: ICD-10-CM

## 2022-05-19 DIAGNOSIS — Z98.890 OTHER SPECIFIED POSTPROCEDURAL STATES: Chronic | ICD-10-CM

## 2022-05-19 DIAGNOSIS — G47.33 OBSTRUCTIVE SLEEP APNEA (ADULT) (PEDIATRIC): ICD-10-CM

## 2022-05-19 LAB
ANION GAP SERPL CALC-SCNC: 6 MMOL/L — SIGNIFICANT CHANGE UP (ref 5–17)
BASOPHILS # BLD AUTO: 0.02 K/UL — SIGNIFICANT CHANGE UP (ref 0–0.2)
BASOPHILS NFR BLD AUTO: 0.1 % — SIGNIFICANT CHANGE UP (ref 0–2)
BUN SERPL-MCNC: 15 MG/DL — SIGNIFICANT CHANGE UP (ref 7–23)
CALCIUM SERPL-MCNC: 8.5 MG/DL — SIGNIFICANT CHANGE UP (ref 8.5–10.1)
CHLORIDE SERPL-SCNC: 108 MMOL/L — SIGNIFICANT CHANGE UP (ref 96–108)
CO2 SERPL-SCNC: 27 MMOL/L — SIGNIFICANT CHANGE UP (ref 22–31)
CREAT SERPL-MCNC: 1.08 MG/DL — SIGNIFICANT CHANGE UP (ref 0.5–1.3)
EGFR: 73 ML/MIN/1.73M2 — SIGNIFICANT CHANGE UP
EOSINOPHIL # BLD AUTO: 0.03 K/UL — SIGNIFICANT CHANGE UP (ref 0–0.5)
EOSINOPHIL NFR BLD AUTO: 0.2 % — SIGNIFICANT CHANGE UP (ref 0–6)
GLUCOSE SERPL-MCNC: 151 MG/DL — HIGH (ref 70–99)
HCT VFR BLD CALC: 34.9 % — LOW (ref 39–50)
HGB BLD-MCNC: 12.1 G/DL — LOW (ref 13–17)
IMM GRANULOCYTES NFR BLD AUTO: 0.4 % — SIGNIFICANT CHANGE UP (ref 0–1.5)
LYMPHOCYTES # BLD AUTO: 0.99 K/UL — LOW (ref 1–3.3)
LYMPHOCYTES # BLD AUTO: 6.5 % — LOW (ref 13–44)
MCHC RBC-ENTMCNC: 29.4 PG — SIGNIFICANT CHANGE UP (ref 27–34)
MCHC RBC-ENTMCNC: 34.7 GM/DL — SIGNIFICANT CHANGE UP (ref 32–36)
MCV RBC AUTO: 84.9 FL — SIGNIFICANT CHANGE UP (ref 80–100)
MONOCYTES # BLD AUTO: 0.16 K/UL — SIGNIFICANT CHANGE UP (ref 0–0.9)
MONOCYTES NFR BLD AUTO: 1 % — LOW (ref 2–14)
NEUTROPHILS # BLD AUTO: 14 K/UL — HIGH (ref 1.8–7.4)
NEUTROPHILS NFR BLD AUTO: 91.8 % — HIGH (ref 43–77)
PLATELET # BLD AUTO: 194 K/UL — SIGNIFICANT CHANGE UP (ref 150–400)
POTASSIUM SERPL-MCNC: 4.2 MMOL/L — SIGNIFICANT CHANGE UP (ref 3.5–5.3)
POTASSIUM SERPL-SCNC: 4.2 MMOL/L — SIGNIFICANT CHANGE UP (ref 3.5–5.3)
RBC # BLD: 4.11 M/UL — LOW (ref 4.2–5.8)
RBC # FLD: 13 % — SIGNIFICANT CHANGE UP (ref 10.3–14.5)
SODIUM SERPL-SCNC: 141 MMOL/L — SIGNIFICANT CHANGE UP (ref 135–145)
WBC # BLD: 15.26 K/UL — HIGH (ref 3.8–10.5)
WBC # FLD AUTO: 15.26 K/UL — HIGH (ref 3.8–10.5)

## 2022-05-19 PROCEDURE — S2900: CPT

## 2022-05-19 PROCEDURE — 85025 COMPLETE CBC W/AUTO DIFF WBC: CPT

## 2022-05-19 PROCEDURE — 38571 LAPAROSCOPY LYMPHADENECTOMY: CPT | Mod: AS

## 2022-05-19 PROCEDURE — 38571 LAPAROSCOPY LYMPHADENECTOMY: CPT

## 2022-05-19 PROCEDURE — 51700 IRRIGATION OF BLADDER: CPT

## 2022-05-19 PROCEDURE — 36415 COLL VENOUS BLD VENIPUNCTURE: CPT

## 2022-05-19 PROCEDURE — 88309 TISSUE EXAM BY PATHOLOGIST: CPT

## 2022-05-19 PROCEDURE — 88307 TISSUE EXAM BY PATHOLOGIST: CPT

## 2022-05-19 PROCEDURE — 55866 LAPS SURG PRST8ECT RPBIC RAD: CPT | Mod: AS

## 2022-05-19 PROCEDURE — 88309 TISSUE EXAM BY PATHOLOGIST: CPT | Mod: 26

## 2022-05-19 PROCEDURE — C1889: CPT

## 2022-05-19 PROCEDURE — 55866 LAPS SURG PRST8ECT RPBIC RAD: CPT

## 2022-05-19 PROCEDURE — 88307 TISSUE EXAM BY PATHOLOGIST: CPT | Mod: 26

## 2022-05-19 PROCEDURE — 80048 BASIC METABOLIC PNL TOTAL CA: CPT

## 2022-05-19 RX ORDER — SODIUM CHLORIDE 9 MG/ML
1000 INJECTION, SOLUTION INTRAVENOUS
Refills: 0 | Status: DISCONTINUED | OUTPATIENT
Start: 2022-05-19 | End: 2022-05-20

## 2022-05-19 RX ORDER — HEPARIN SODIUM 5000 [USP'U]/ML
5000 INJECTION INTRAVENOUS; SUBCUTANEOUS EVERY 8 HOURS
Refills: 0 | Status: DISCONTINUED | OUTPATIENT
Start: 2022-05-19 | End: 2022-05-20

## 2022-05-19 RX ORDER — ONDANSETRON 8 MG/1
4 TABLET, FILM COATED ORAL EVERY 6 HOURS
Refills: 0 | Status: DISCONTINUED | OUTPATIENT
Start: 2022-05-19 | End: 2022-05-20

## 2022-05-19 RX ORDER — AMLODIPINE BESYLATE 2.5 MG/1
5 TABLET ORAL DAILY
Refills: 0 | Status: DISCONTINUED | OUTPATIENT
Start: 2022-05-19 | End: 2022-05-20

## 2022-05-19 RX ORDER — TOBRAMYCIN 0.3 %
1 DROPS OPHTHALMIC (EYE) EVERY 4 HOURS
Refills: 0 | Status: COMPLETED | OUTPATIENT
Start: 2022-05-19 | End: 2022-05-19

## 2022-05-19 RX ORDER — FAMOTIDINE 10 MG/ML
20 INJECTION INTRAVENOUS ONCE
Refills: 0 | Status: COMPLETED | OUTPATIENT
Start: 2022-05-19 | End: 2022-05-19

## 2022-05-19 RX ORDER — ACETAMINOPHEN 500 MG
650 TABLET ORAL EVERY 6 HOURS
Refills: 0 | Status: DISCONTINUED | OUTPATIENT
Start: 2022-05-19 | End: 2022-05-20

## 2022-05-19 RX ORDER — CEFAZOLIN SODIUM 1 G
1000 VIAL (EA) INJECTION EVERY 8 HOURS
Refills: 0 | Status: COMPLETED | OUTPATIENT
Start: 2022-05-19 | End: 2022-05-20

## 2022-05-19 RX ORDER — OXYCODONE AND ACETAMINOPHEN 5; 325 MG/1; MG/1
1 TABLET ORAL EVERY 4 HOURS
Refills: 0 | Status: DISCONTINUED | OUTPATIENT
Start: 2022-05-19 | End: 2022-05-20

## 2022-05-19 RX ORDER — CEFAZOLIN SODIUM 1 G
1000 VIAL (EA) INJECTION EVERY 8 HOURS
Refills: 0 | Status: DISCONTINUED | OUTPATIENT
Start: 2022-05-19 | End: 2022-05-19

## 2022-05-19 RX ORDER — LOSARTAN POTASSIUM 100 MG/1
50 TABLET, FILM COATED ORAL DAILY
Refills: 0 | Status: DISCONTINUED | OUTPATIENT
Start: 2022-05-19 | End: 2022-05-20

## 2022-05-19 RX ORDER — HYDROMORPHONE HYDROCHLORIDE 2 MG/ML
1 INJECTION INTRAMUSCULAR; INTRAVENOUS; SUBCUTANEOUS EVERY 4 HOURS
Refills: 0 | Status: DISCONTINUED | OUTPATIENT
Start: 2022-05-19 | End: 2022-05-20

## 2022-05-19 RX ADMIN — AMLODIPINE BESYLATE 5 MILLIGRAM(S): 2.5 TABLET ORAL at 15:23

## 2022-05-19 RX ADMIN — HYDROMORPHONE HYDROCHLORIDE 0.5 MILLIGRAM(S): 2 INJECTION INTRAMUSCULAR; INTRAVENOUS; SUBCUTANEOUS at 11:44

## 2022-05-19 RX ADMIN — SODIUM CHLORIDE 3 MILLILITER(S): 9 INJECTION INTRAMUSCULAR; INTRAVENOUS; SUBCUTANEOUS at 14:22

## 2022-05-19 RX ADMIN — HEPARIN SODIUM 5000 UNIT(S): 5000 INJECTION INTRAVENOUS; SUBCUTANEOUS at 15:23

## 2022-05-19 RX ADMIN — LOSARTAN POTASSIUM 50 MILLIGRAM(S): 100 TABLET, FILM COATED ORAL at 15:24

## 2022-05-19 RX ADMIN — Medication 1000 MILLIGRAM(S): at 15:24

## 2022-05-19 RX ADMIN — HEPARIN SODIUM 5000 UNIT(S): 5000 INJECTION INTRAVENOUS; SUBCUTANEOUS at 22:32

## 2022-05-19 RX ADMIN — Medication 1 DROP(S): at 15:24

## 2022-05-19 RX ADMIN — Medication 1 DROP(S): at 20:29

## 2022-05-19 RX ADMIN — Medication 1 DROP(S): at 12:15

## 2022-05-19 RX ADMIN — SODIUM CHLORIDE 100 MILLILITER(S): 9 INJECTION, SOLUTION INTRAVENOUS at 15:21

## 2022-05-19 RX ADMIN — SODIUM CHLORIDE 3 MILLILITER(S): 9 INJECTION INTRAMUSCULAR; INTRAVENOUS; SUBCUTANEOUS at 22:29

## 2022-05-19 RX ADMIN — HYDROMORPHONE HYDROCHLORIDE 0.5 MILLIGRAM(S): 2 INJECTION INTRAMUSCULAR; INTRAVENOUS; SUBCUTANEOUS at 14:23

## 2022-05-19 RX ADMIN — Medication 1000 MILLIGRAM(S): at 23:23

## 2022-05-19 RX ADMIN — FAMOTIDINE 20 MILLIGRAM(S): 10 INJECTION INTRAVENOUS at 06:48

## 2022-05-19 NOTE — BRIEF OPERATIVE NOTE - NSICDXBRIEFPROCEDURE_GEN_ALL_CORE_FT
PROCEDURES:  Robot-assisted prostatectomy 19-May-2022 11:47:57 w/ bilateral plevic lymph node dissection Dyreyes, Victor M

## 2022-05-19 NOTE — PROGRESS NOTE ADULT - SUBJECTIVE AND OBJECTIVE BOX
POC    71yMale with prostate cancer POD 0 s/p robotic radical prostatectomy presents for POC.  Pt seen and examined. Complains of left eye pain - corneal abrasion from OR - anesthesia following. Complains of mild pelvic pain. Tolerating clears.  Denies SOB/CP/N/V.     acetaminophen     Tablet .. 650 milliGRAM(s) Oral every 6 hours PRN  amLODIPine   Tablet 5 milliGRAM(s) Oral daily  ceFAZolin  Injectable. 1000 milliGRAM(s) IV Push every 8 hours  dextrose 5% + sodium chloride 0.45%. 1000 milliLiter(s) IV Continuous <Continuous>  heparin   Injectable 5000 Unit(s) SubCutaneous every 8 hours  HYDROmorphone  Injectable 1 milliGRAM(s) IV Push every 4 hours PRN  losartan 50 milliGRAM(s) Oral daily  ondansetron Injectable 4 milliGRAM(s) IV Push every 6 hours PRN  oxycodone    5 mG/acetaminophen 325 mG 1 Tablet(s) Oral every 4 hours PRN  prochlorperazine   Injectable 10 milliGRAM(s) IV Push once PRN  sodium chloride 0.9% lock flush 3 milliLiter(s) IV Push every 8 hours  tobramycin 0.3% Ophthalmic Solution 1 Drop(s) Left EYE every 4 hours      Vital Signs Last 24 Hrs  T(C): 36.8 (19 May 2022 15:10), Max: 36.8 (19 May 2022 15:10)  T(F): 98.3 (19 May 2022 15:10), Max: 98.3 (19 May 2022 15:10)  HR: 86 (19 May 2022 15:10) (66 - 86)  BP: 138/81 (19 May 2022 15:10) (113/73 - 145/93)  BP(mean): --  RR: 16 (19 May 2022 15:10) (9 - 16)  SpO2: 100% (19 May 2022 15:10) (94% - 100%)    I&O's Summary    19 May 2022 07:01  -  19 May 2022 16:39  --------------------------------------------------------  IN: 1800 mL / OUT: 455 mL / NET: 1345 mL    UOP: >400cc    Physical Exam  Gen: NAD, comfortable in bed  Lungs: CTAB  Heart: S1/S2, RRR  Abd: Softly distended, + incisional tenderness, no rebound no guarding. +BS. incisions c/d/i, DANIEL in place with dark bloody drainage  : woods in place with clear urine  Neuro: A&Ox3  Extremities: venodynes in place. no edema                          12.1   15.26 )-----------( 194      ( 19 May 2022 12:18 )             34.9       05-19    141  |  108  |  15  ----------------------------<  151<H>  4.2   |  27  |  1.08    Ca    8.5      19 May 2022 12:18        A/P: 71y Male POD 0 s/p radical robotic prostatectomy presents with possible corneal abrasion and pelvic discomfort, currently stable.  anesthesia following  DVT prophylaxis/OOB  Encourage Incentive spirometry  Strict I&O's  pain control  ADAT  AM labs

## 2022-05-20 ENCOUNTER — TRANSCRIPTION ENCOUNTER (OUTPATIENT)
Age: 72
End: 2022-05-20

## 2022-05-20 VITALS
SYSTOLIC BLOOD PRESSURE: 135 MMHG | DIASTOLIC BLOOD PRESSURE: 74 MMHG | OXYGEN SATURATION: 99 % | RESPIRATION RATE: 18 BRPM | TEMPERATURE: 98 F | HEART RATE: 70 BPM

## 2022-05-20 LAB
ANION GAP SERPL CALC-SCNC: 7 MMOL/L — SIGNIFICANT CHANGE UP (ref 5–17)
BASOPHILS # BLD AUTO: 0.01 K/UL — SIGNIFICANT CHANGE UP (ref 0–0.2)
BASOPHILS NFR BLD AUTO: 0.1 % — SIGNIFICANT CHANGE UP (ref 0–2)
BUN SERPL-MCNC: 11 MG/DL — SIGNIFICANT CHANGE UP (ref 7–23)
CALCIUM SERPL-MCNC: 8.4 MG/DL — LOW (ref 8.5–10.1)
CHLORIDE SERPL-SCNC: 108 MMOL/L — SIGNIFICANT CHANGE UP (ref 96–108)
CO2 SERPL-SCNC: 25 MMOL/L — SIGNIFICANT CHANGE UP (ref 22–31)
CREAT SERPL-MCNC: 0.78 MG/DL — SIGNIFICANT CHANGE UP (ref 0.5–1.3)
EGFR: 95 ML/MIN/1.73M2 — SIGNIFICANT CHANGE UP
EOSINOPHIL # BLD AUTO: 0 K/UL — SIGNIFICANT CHANGE UP (ref 0–0.5)
EOSINOPHIL NFR BLD AUTO: 0 % — SIGNIFICANT CHANGE UP (ref 0–6)
GLUCOSE SERPL-MCNC: 127 MG/DL — HIGH (ref 70–99)
HCT VFR BLD CALC: 34.5 % — LOW (ref 39–50)
HGB BLD-MCNC: 11.8 G/DL — LOW (ref 13–17)
IMM GRANULOCYTES NFR BLD AUTO: 0.3 % — SIGNIFICANT CHANGE UP (ref 0–1.5)
LYMPHOCYTES # BLD AUTO: 1.7 K/UL — SIGNIFICANT CHANGE UP (ref 1–3.3)
LYMPHOCYTES # BLD AUTO: 14.7 % — SIGNIFICANT CHANGE UP (ref 13–44)
MCHC RBC-ENTMCNC: 28.8 PG — SIGNIFICANT CHANGE UP (ref 27–34)
MCHC RBC-ENTMCNC: 34.2 GM/DL — SIGNIFICANT CHANGE UP (ref 32–36)
MCV RBC AUTO: 84.1 FL — SIGNIFICANT CHANGE UP (ref 80–100)
MONOCYTES # BLD AUTO: 0.93 K/UL — HIGH (ref 0–0.9)
MONOCYTES NFR BLD AUTO: 8 % — SIGNIFICANT CHANGE UP (ref 2–14)
NEUTROPHILS # BLD AUTO: 8.9 K/UL — HIGH (ref 1.8–7.4)
NEUTROPHILS NFR BLD AUTO: 76.9 % — SIGNIFICANT CHANGE UP (ref 43–77)
PLATELET # BLD AUTO: 198 K/UL — SIGNIFICANT CHANGE UP (ref 150–400)
POTASSIUM SERPL-MCNC: 4.1 MMOL/L — SIGNIFICANT CHANGE UP (ref 3.5–5.3)
POTASSIUM SERPL-SCNC: 4.1 MMOL/L — SIGNIFICANT CHANGE UP (ref 3.5–5.3)
RBC # BLD: 4.1 M/UL — LOW (ref 4.2–5.8)
RBC # FLD: 13 % — SIGNIFICANT CHANGE UP (ref 10.3–14.5)
SODIUM SERPL-SCNC: 140 MMOL/L — SIGNIFICANT CHANGE UP (ref 135–145)
WBC # BLD: 11.58 K/UL — HIGH (ref 3.8–10.5)
WBC # FLD AUTO: 11.58 K/UL — HIGH (ref 3.8–10.5)

## 2022-05-20 RX ORDER — OXYCODONE HYDROCHLORIDE 5 MG/1
1 TABLET ORAL
Qty: 12 | Refills: 0
Start: 2022-05-20

## 2022-05-20 RX ORDER — ACETAMINOPHEN 500 MG
2 TABLET ORAL
Qty: 0 | Refills: 0 | DISCHARGE
Start: 2022-05-20

## 2022-05-20 RX ORDER — TOBRAMYCIN 0.3 %
1 DROPS OPHTHALMIC (EYE) EVERY 4 HOURS
Refills: 0 | Status: DISCONTINUED | OUTPATIENT
Start: 2022-05-20 | End: 2022-05-20

## 2022-05-20 RX ADMIN — Medication 1000 MILLIGRAM(S): at 11:55

## 2022-05-20 RX ADMIN — HEPARIN SODIUM 5000 UNIT(S): 5000 INJECTION INTRAVENOUS; SUBCUTANEOUS at 05:36

## 2022-05-20 RX ADMIN — SODIUM CHLORIDE 100 MILLILITER(S): 9 INJECTION, SOLUTION INTRAVENOUS at 10:39

## 2022-05-20 RX ADMIN — SODIUM CHLORIDE 100 MILLILITER(S): 9 INJECTION, SOLUTION INTRAVENOUS at 00:58

## 2022-05-20 RX ADMIN — AMLODIPINE BESYLATE 5 MILLIGRAM(S): 2.5 TABLET ORAL at 10:42

## 2022-05-20 RX ADMIN — LOSARTAN POTASSIUM 50 MILLIGRAM(S): 100 TABLET, FILM COATED ORAL at 10:42

## 2022-05-20 RX ADMIN — Medication 1 DROP(S): at 05:36

## 2022-05-20 RX ADMIN — Medication 1 DROP(S): at 10:42

## 2022-05-20 NOTE — DISCHARGE NOTE PROVIDER - NSDCCPCAREPLAN_GEN_ALL_CORE_FT
PRINCIPAL DISCHARGE DIAGNOSIS  Diagnosis: Prostate cancer  Assessment and Plan of Treatment:       SECONDARY DISCHARGE DIAGNOSES  Diagnosis: Hypertension  Assessment and Plan of Treatment:

## 2022-05-20 NOTE — DISCHARGE NOTE PROVIDER - HOSPITAL COURSE
72 y/o Azeri speaking M admitted with a pre op dx of prostate cancer.   PAST MEDICAL HISTORY: Arthritis  At risk for obstructive sleep apnea CANDI precautions. Pt >3 criteria on STOP-Bang questionnaire. HTN (hypertension) Prostate CA.     Taken to the OR on 5/19/22 underwent a robotic assisted radical prostatectomy.  Post op course was stable. Pt c/o pain and irritation of LEFT eye and dx with suspected corneal abrasion, treated with TObrex eye gtts,    Labs POD 1 stable and pt tolerated diet and was OOB ambulating.     DANIEL drain removed POD 1 and pt discharged home WITH GRIER to follow up as out pt

## 2022-05-20 NOTE — DISCHARGE NOTE PROVIDER - CARE PROVIDER_API CALL
Amando Mcknight)  Urology  [address], [address] [place] [address]  Phone: [phone]  Fax: [phone]  Follow Up Time: 1 week

## 2022-05-20 NOTE — DISCHARGE NOTE PROVIDER - NSDCCPTREATMENT_GEN_ALL_CORE_FT
PRINCIPAL PROCEDURE  Procedure: Robot-assisted prostatectomy  Findings and Treatment: w/ bilateral plevic lymph node dissection

## 2022-05-20 NOTE — DISCHARGE NOTE PROVIDER - NSDCMRMEDTOKEN_GEN_ALL_CORE_FT
acetaminophen 500 mg oral tablet: 2 tab(s) orally every 6 hours  amLODIPine 5 mg oral tablet: 1 tab(s) orally once a day  losartan 50 mg oral tablet: 1 tab(s) orally once a day  oxyCODONE 5 mg oral tablet: 1 tab(s) orally every 6 hours, As Needed -for muscle spasm - for moderate pain MDD:4

## 2022-05-20 NOTE — PROGRESS NOTE ADULT - ASSESSMENT
70yo male stable POD 1 s/p radical prostatectomy  ·	Doing well  ·	Labs stable  ·	Jameson diet  ·	OOB ambulating  ·	DC DANIEL drain  ·	Hoyos teaching    Home with Hoyos after lunch    DW Dr. Mcknight

## 2022-05-20 NOTE — PROGRESS NOTE ADULT - SUBJECTIVE AND OBJECTIVE BOX
Progress Note- Urology    POST OPERATIVE DAY #  1            Status Post:  robotic assisted radical prostatectomy    SUBJECTIVE: Feels comfortable- Flatus + no BM  tolerated diet OOB ambulating   Minimal pain     MEDICATIONS  (STANDING):  amLODIPine   Tablet 5 milliGRAM(s) Oral daily  ceFAZolin  Injectable. 1000 milliGRAM(s) IV Push every 8 hours  dextrose 5% + sodium chloride 0.45%. 1000 milliLiter(s) (100 mL/Hr) IV Continuous <Continuous>  heparin   Injectable 5000 Unit(s) SubCutaneous every 8 hours  losartan 50 milliGRAM(s) Oral daily  sodium chloride 0.9% lock flush 3 milliLiter(s) IV Push every 8 hours  tobramycin 0.3% Ophthalmic Solution 1 Drop(s) Left EYE every 4 hours    MEDICATIONS  (PRN):  acetaminophen     Tablet .. 650 milliGRAM(s) Oral every 6 hours PRN Temp greater or equal to 38.5C (101.3F), Mild Pain (1 - 3)  HYDROmorphone  Injectable 1 milliGRAM(s) IV Push every 4 hours PRN Severe Pain (7 - 10)  ondansetron Injectable 4 milliGRAM(s) IV Push every 6 hours PRN Nausea  oxycodone    5 mG/acetaminophen 325 mG 1 Tablet(s) Oral every 4 hours PRN Moderate Pain (4 - 6)  prochlorperazine   Injectable 10 milliGRAM(s) IV Push once PRN Nausea not resolved by Zofran      Vital Signs Last 24 Hrs  T(C): 36.8 (20 May 2022 08:18), Max: 36.8 (19 May 2022 15:10)  T(F): 98.2 (20 May 2022 08:18), Max: 98.3 (19 May 2022 15:10)  HR: 70 (20 May 2022 08:18) (66 - 86)  BP: 135/74 (20 May 2022 08:18) (113/73 - 152/72)  BP(mean): --  RR: 18 (20 May 2022 08:18) (9 - 18)  SpO2: 99% (20 May 2022 08:18) (94% - 100%)    PHYSICAL EXAM:    Constitutional:   WDWN male appears comfortable sitting up in bed    Eyes:   sclera clear, non injected  PERRL    Respiratory:  CTA    Cardiovascular:  RR S1S2 no     Gastrointestinal:   post op wounds all CDI  DANIEL drain serosanguinous    Genitourinary:   Hoyos in place secured with STAT LOCK  urine clear yellow     Extremities: no CCE SCD in place     I&O's Detail    19 May 2022 07:01  -  20 May 2022 07:00  --------------------------------------------------------  IN:    Lactated Ringers: 600 mL    Other (mL): 1200 mL  Total IN: 1800 mL    OUT:    Bulb (mL): 100 mL    Indwelling Catheter - Urethral (mL): 4975 mL  Total OUT: 5075 mL    Total NET: -3275 mL          LABS:                        11.8   11.58 )-----------( 198      ( 20 May 2022 05:51 )             34.5     05-20    140  |  108  |  11  ----------------------------<  127<H>  4.1   |  25  |  0.78    Ca    8.4<L>      20 May 2022 05:51               Progress Note- Urology    POST OPERATIVE DAY #  1            Status Post:  robotic assisted radical prostatectomy    SUBJECTIVE: Feels comfortable- Flatus + no BM  tolerated diet OOB ambulating   Minimal pain     MEDICATIONS  (STANDING):  amLODIPine   Tablet 5 milliGRAM(s) Oral daily  ceFAZolin  Injectable. 1000 milliGRAM(s) IV Push every 8 hours  dextrose 5% + sodium chloride 0.45%. 1000 milliLiter(s) (100 mL/Hr) IV Continuous <Continuous>  heparin   Injectable 5000 Unit(s) SubCutaneous every 8 hours  losartan 50 milliGRAM(s) Oral daily  sodium chloride 0.9% lock flush 3 milliLiter(s) IV Push every 8 hours  tobramycin 0.3% Ophthalmic Solution 1 Drop(s) Left EYE every 4 hours    MEDICATIONS  (PRN):  acetaminophen     Tablet .. 650 milliGRAM(s) Oral every 6 hours PRN Temp greater or equal to 38.5C (101.3F), Mild Pain (1 - 3)  HYDROmorphone  Injectable 1 milliGRAM(s) IV Push every 4 hours PRN Severe Pain (7 - 10)  ondansetron Injectable 4 milliGRAM(s) IV Push every 6 hours PRN Nausea  oxycodone    5 mG/acetaminophen 325 mG 1 Tablet(s) Oral every 4 hours PRN Moderate Pain (4 - 6)  prochlorperazine   Injectable 10 milliGRAM(s) IV Push once PRN Nausea not resolved by Zofran      Vital Signs Last 24 Hrs  T(C): 36.8 (20 May 2022 08:18), Max: 36.8 (19 May 2022 15:10)  T(F): 98.2 (20 May 2022 08:18), Max: 98.3 (19 May 2022 15:10)  HR: 70 (20 May 2022 08:18) (66 - 86)  BP: 135/74 (20 May 2022 08:18) (113/73 - 152/72)  BP(mean): --  RR: 18 (20 May 2022 08:18) (9 - 18)  SpO2: 99% (20 May 2022 08:18) (94% - 100%)    PHYSICAL EXAM:    Constitutional:   WDWN male appears comfortable sitting up in bed    Eyes:   sclera clear, non injected  PERRL    Respiratory:  CTA    Cardiovascular:  RR S1S2    Gastrointestinal:   post op wounds all CDI  DANIEL drain serosanguinous    Genitourinary:   Hoyos in place secured with STAT LOCK  urine clear yellow     Extremities: no CCE SCD in place     I&O's Detail    19 May 2022 07:01  -  20 May 2022 07:00  --------------------------------------------------------  IN:    Lactated Ringers: 600 mL    Other (mL): 1200 mL  Total IN: 1800 mL    OUT:    Bulb (mL): 100 mL    Indwelling Catheter - Urethral (mL): 4975 mL  Total OUT: 5075 mL    Total NET: -3275 mL          LABS:                        11.8   11.58 )-----------( 198      ( 20 May 2022 05:51 )             34.5     05-20    140  |  108  |  11  ----------------------------<  127<H>  4.1   |  25  |  0.78    Ca    8.4<L>      20 May 2022 05:51

## 2022-05-20 NOTE — DISCHARGE NOTE NURSING/CASE MANAGEMENT/SOCIAL WORK - PATIENT PORTAL LINK FT
You can access the FollowMyHealth Patient Portal offered by [facility] by registering at the following website: [email or web address]. By joining [employer]’s FollowMyHealth portal, you will also be able to view your health information using other applications (apps) compatible with our system.

## 2022-05-25 LAB — SARS-COV-2 N GENE NPH QL NAA+PROBE: NOT DETECTED

## 2022-05-27 ENCOUNTER — APPOINTMENT (OUTPATIENT)
Dept: UROLOGY | Facility: CLINIC | Age: 72
End: 2022-05-27

## 2022-05-27 VITALS — OXYGEN SATURATION: 100 % | SYSTOLIC BLOOD PRESSURE: 147 MMHG | DIASTOLIC BLOOD PRESSURE: 85 MMHG | HEART RATE: 99 BPM

## 2022-06-01 ENCOUNTER — APPOINTMENT (OUTPATIENT)
Dept: UROLOGY | Facility: CLINIC | Age: 72
End: 2022-06-01
Payer: MEDICARE

## 2022-06-01 VITALS
WEIGHT: 201 LBS | OXYGEN SATURATION: 97 % | HEART RATE: 75 BPM | BODY MASS INDEX: 32.3 KG/M2 | HEIGHT: 66 IN | DIASTOLIC BLOOD PRESSURE: 77 MMHG | SYSTOLIC BLOOD PRESSURE: 142 MMHG

## 2022-06-01 PROCEDURE — 99024 POSTOP FOLLOW-UP VISIT: CPT

## 2022-06-02 ENCOUNTER — RESULT REVIEW (OUTPATIENT)
Age: 72
End: 2022-06-02

## 2022-06-03 LAB
APPEARANCE: CLEAR
BACTERIA UR CULT: NORMAL
BACTERIA: NEGATIVE
BILIRUBIN URINE: NEGATIVE
BLOOD URINE: NORMAL
COLOR: NORMAL
GLUCOSE QUALITATIVE U: NEGATIVE
HYALINE CASTS: 2 /LPF
KETONES URINE: NEGATIVE
LEUKOCYTE ESTERASE URINE: ABNORMAL
MICROSCOPIC-UA: NORMAL
NITRITE URINE: NEGATIVE
PH URINE: 6.5
PROTEIN URINE: NEGATIVE
RED BLOOD CELLS URINE: 1 /HPF
SPECIFIC GRAVITY URINE: 1.01
SQUAMOUS EPITHELIAL CELLS: 0 /HPF
UROBILINOGEN URINE: NORMAL
WHITE BLOOD CELLS URINE: 2 /HPF

## 2022-06-06 ENCOUNTER — OUTPATIENT (OUTPATIENT)
Dept: OUTPATIENT SERVICES | Facility: HOSPITAL | Age: 72
LOS: 1 days | End: 2022-06-06
Payer: MEDICARE

## 2022-06-06 ENCOUNTER — APPOINTMENT (OUTPATIENT)
Dept: CT IMAGING | Facility: CLINIC | Age: 72
End: 2022-06-06
Payer: MEDICARE

## 2022-06-06 DIAGNOSIS — Z98.890 OTHER SPECIFIED POSTPROCEDURAL STATES: Chronic | ICD-10-CM

## 2022-06-06 DIAGNOSIS — C61 MALIGNANT NEOPLASM OF PROSTATE: ICD-10-CM

## 2022-06-06 PROCEDURE — 74177 CT ABD & PELVIS W/CONTRAST: CPT | Mod: 26

## 2022-06-06 PROCEDURE — 74177 CT ABD & PELVIS W/CONTRAST: CPT

## 2022-06-08 ENCOUNTER — APPOINTMENT (OUTPATIENT)
Dept: UROLOGY | Facility: CLINIC | Age: 72
End: 2022-06-08
Payer: MEDICARE

## 2022-06-08 ENCOUNTER — APPOINTMENT (OUTPATIENT)
Dept: ULTRASOUND IMAGING | Facility: CLINIC | Age: 72
End: 2022-06-08
Payer: MEDICARE

## 2022-06-08 ENCOUNTER — EMERGENCY (EMERGENCY)
Facility: HOSPITAL | Age: 72
LOS: 0 days | Discharge: HOME | End: 2022-06-08
Attending: EMERGENCY MEDICINE
Payer: MEDICARE

## 2022-06-08 ENCOUNTER — OUTPATIENT (OUTPATIENT)
Dept: OUTPATIENT SERVICES | Facility: HOSPITAL | Age: 72
LOS: 1 days | End: 2022-06-08
Payer: MEDICARE

## 2022-06-08 ENCOUNTER — NON-APPOINTMENT (OUTPATIENT)
Age: 72
End: 2022-06-08

## 2022-06-08 VITALS
DIASTOLIC BLOOD PRESSURE: 88 MMHG | HEART RATE: 81 BPM | RESPIRATION RATE: 18 BRPM | TEMPERATURE: 98 F | OXYGEN SATURATION: 94 % | SYSTOLIC BLOOD PRESSURE: 151 MMHG

## 2022-06-08 VITALS — WEIGHT: 190.04 LBS | HEIGHT: 67 IN

## 2022-06-08 DIAGNOSIS — Z98.890 OTHER SPECIFIED POSTPROCEDURAL STATES: Chronic | ICD-10-CM

## 2022-06-08 DIAGNOSIS — I10 ESSENTIAL (PRIMARY) HYPERTENSION: ICD-10-CM

## 2022-06-08 DIAGNOSIS — C61 MALIGNANT NEOPLASM OF PROSTATE: ICD-10-CM

## 2022-06-08 DIAGNOSIS — M79.89 OTHER SPECIFIED SOFT TISSUE DISORDERS: ICD-10-CM

## 2022-06-08 DIAGNOSIS — I82.402 ACUTE EMBOLISM AND THROMBOSIS OF UNSPECIFIED DEEP VEINS OF LEFT LOWER EXTREMITY: ICD-10-CM

## 2022-06-08 DIAGNOSIS — M19.90 UNSPECIFIED OSTEOARTHRITIS, UNSPECIFIED SITE: ICD-10-CM

## 2022-06-08 PROCEDURE — 99282 EMERGENCY DEPT VISIT SF MDM: CPT

## 2022-06-08 PROCEDURE — 93971 EXTREMITY STUDY: CPT | Mod: 26,LT

## 2022-06-08 PROCEDURE — 93971 EXTREMITY STUDY: CPT

## 2022-06-08 PROCEDURE — 99283 EMERGENCY DEPT VISIT LOW MDM: CPT

## 2022-06-08 PROCEDURE — 99024 POSTOP FOLLOW-UP VISIT: CPT

## 2022-06-08 RX ORDER — APIXABAN 2.5 MG/1
1 TABLET, FILM COATED ORAL
Qty: 56 | Refills: 0
Start: 2022-06-08 | End: 2022-06-21

## 2022-06-08 RX ORDER — APIXABAN 2.5 MG/1
10 TABLET, FILM COATED ORAL ONCE
Refills: 0 | Status: COMPLETED | OUTPATIENT
Start: 2022-06-08 | End: 2022-06-08

## 2022-06-08 RX ADMIN — APIXABAN 10 MILLIGRAM(S): 2.5 TABLET, FILM COATED ORAL at 18:53

## 2022-06-08 NOTE — ED STATDOCS - PROGRESS NOTE DETAILS
Pt. is a 71 year old male Hx prostate CA, HTN, s/p total prostatectomy 5/17.  Pt. had ultrasond perfomred today and was found to have DVT.  Partially occlusive thrombus left great saphenouse vein/common femoral vein.  Denies CP, SOB.  Mia Keys PA-C Pt. is a 71 year old male Hx prostate CA, HTN, s/p total prostatectomy 5/17.  Pt. had ultrasound performed today and was found to have DVT.  Partially occlusive thrombus left great saphenous vein/common femoral vein.  Denies CP, SOB.  Mia Keys PA-C Reviewed old labs with attending in [facility].  Pt. to be DC with Eliquis 10mg BID x 7 days then 5 mg BID x 5 months.  Will provide vascular referral.  Mia Keys PA-C Pt. is a 71 year old male Hx prostate CA, HTN, s/p total prostatectomy 5/17 with Dr. Mcknight.  Pt. had ultrasound performed today and was found to have DVT.  Partially occlusive thrombus left great saphenous vein/common femoral vein.  Donalies CP, SOB.  Mia Keys PA-C Reviewed old labs with attending in [facility]IE.  Pt. to be DC with Eliquis 10mg BID x 7 days then 5 mg BID x 5 months.  Will provide vascular referral.  Daughter at bedside to review dosing.  Fall precautions stressed.  Mia Keys PA-C

## 2022-06-08 NOTE — ED STATDOCS - ATTENDING APP SHARED VISIT CONTRIBUTION OF CARE
I,Joseph Pittman MD,  performed the initial face to face bedside interview with this patient regarding history of present illness, review of symptoms and relevant past medical, social and family history.  I completed an independent physical examination.  I was the initial provider who evaluated this patient. I have signed out the follow up of any pending tests (i.e. labs, radiological studies) to the ACP.  I have communicated the patient’s plan of care and disposition with the ACP.  The history, relevant review of systems, past medical and surgical history, medical decision making, and physical examination was documented by the scribe in my presence and I attest to the accuracy of the documentation.

## 2022-06-08 NOTE — ED STATDOCS - NSFOLLOWUPINSTRUCTIONS_ED_ALL_ED_FT
ELIQUIS (BLOOD THINNER)  2 TABS TWICE A DAY FOR 7 DAYS  1 TAB TWICE A DAY THEREAFTER         ¿Cuáles son las causas?    Esta afección puede ser causada por lo siguiente:  •Cyn ralentización de la circulación sanguínea.      •Daño en cyn vena.      •Cyn afección que hace que la isrrael se coagule más fácilmente, radha ciertos trastornos de coagulación de la isrrael.        ¿Qué incrementa el riesgo?    Los siguientes factores pueden hacer que sea más propenso a desarrollar esta afección:  •Tener obesidad.      •Tener edad avanzada, en especial más de 60 años.    •Ser inactivo (estilo de flor sedentario) o no moverse. [place] puede incluir:  •Permanecer sentado o recostado marii más de 4 a 6 horas por otro motivo que no sea dormir por la noche.      •Estar en el hospital, someterse a cyn cirugía mayor o prolongada, o tener un tubo harrison y flexible (catéter central) colocado en cyn vena joaquin.        •Estar embarazada, sydney a javier o elif dado a javier recientemente.      •Vernon medicamentos que contienen estrógenos, radha las píldoras anticonceptivas o la terapia de reemplazo hormonal.      •Consumir productos que contengan nicotina o tabaco, especialmente si usa un método anticonceptivo hormonal.      •Tener antecedentes de coágulos sanguíneos o cyn enfermedad de coagulación de la isrrael, cyn enfermedad de los vasos sanguíneos (enfermedad vascular periférica) o enfermedad cardíaca congestiva.      •Tener antecedentes de cáncer, especialmente si recibió tratamiento con quimioterapia.        ¿Cuáles son los signos o síntomas?    Los síntomas de esta afección incluyen:  •Hinchazón, dolor, presión o sensibilidad en un brazo o cyn pierna.      •Un brazo o cyn pierna se calienta, enrojece o cambia de color.      •Cyn pierna se torna muy pálida. Puede tener cyn TVP joaquin. [place]sey es poco frecuente.      Si el coágulo está ubicado en la pierna, los síntomas pueden ser más notorios o empeorar al pararse o caminar.    En algunos casos, no hay síntomas.      ¿Cómo se diagnostica?    Esta afección se diagnostica mediante:  •Los antecedentes médicos y un examen físico.    •Pruebas, radha, por ejemplo:  •Análisis de isrrael para verificar qué tan ashlyn coagula barron isrrael.      •Cyn ecografía Doppler. Esta es la mejor forma de detectar cyn TVP.      •Venograma. Se inyecta un tinte de contraste en cyn vena y se delma radiografías para verificar si hay coágulos.          ¿Cómo se trata?    El tratamiento de esta afección depende de lo siguiente:  •La causa de barron TVP.      •El tamaño y la ubicación de la TVP, o tener más de cyn TVP.      •Barron riesgo de tener cyn hemorragia y tener más coágulos.      •Otras enfermedades que puede tener.      El tratamiento puede incluir:  •Vernon un anticoagulante para prevenir la formación y el crecimiento de coágulos.      •Usar medias de compresión, si se lo indican.      •Aplicarse inyecciones de medicamentos para romper el coágulo (trombólisis dirigida por catéter). Woolsey se utiliza solo en casos de TVP grave y únicamente si un especialista lo recomienda.    •Procedimientos quirúrgicos específicos cuando la TVP es grave o difícil de tratar. Estos se pueden realizar para:  •Aislar y retirar el coágulo.      •Colocar un filtro de vena cava inferior (VCI) en cyn vena joaquin para capturar los coágulos de isrrael antes de que lleguen a los pulmones.        Puede recibir tratamientos médicos marii 6 meses o más tiempo.      Siga estas instrucciones en barron casa:    Si está tomando anticoagulantes, tenga en cuenta lo siguiente:     •Hable con el médico antes de vernon cualquier medicamento que contenga aspirina o antiinflamatorios no esteroideos (CHERYL), radha el ibuprofeno. Estos medicamentos aumentan el riesgo de tener cyn hemorragia peligrosa.      •[place] los medicamentos exactamente radha se lo indicaron, todos los días a la misma hora. No se saltee cyn dosis. No tome más de la dosis recetada. [place] es importante.      •Pregúntele a barron médico sobre los alimentos y medicamentos que pueden cambiar la forma en que funciona el anticoagulante (pueden interactuar). Evite estos alimentos y medicamentos si se lo indican.    •Evite todo lo que pueda causar sangrados o moretones. Puede sangrar con más facilidad mientras landon anticoagulantes.  •Tenga sumo cuidado al usar cuchillos, tijeras u otros objetos filosos.      •Aféitese con cyn rasuradora eléctrica en lugar de hacerlo con cyn hoja de afeitar.      •Evite las actividades que podrían causarle lesiones o moretones y siga las instrucciones para evitar las caídas.      •Informe al médico si ha tenido sangrado interno, úlceras sangrantes o enfermedades neurológicas, radha accidentes cerebrovasculares o aneurismas cerebrales.        •Use un brazalete de alerta médica o lleve cyn tarjeta con cyn lista de los medicamentos que landon.      Instrucciones generales     •Use los medicamentos de venta meliton y los recetados solamente radha se lo haya indicado el médico.      •Retome chitra actividades normales según lo indicado por el médico. Pregúntele al médico qué actividades son seguras para usted.      •Si se lo recomienda el médico, use medias de compresión según chitra indicaciones. Estas medias ayudan a evitar la formación de coágulos de isrrael y a reducir la hinchazón de las piernas.      •Concurra a todas las visitas de seguimiento radha se lo haya indicado el médico. [place] es importante.        Comuníquese con un médico si:    •Se saltea cyn dosis del anticoagulante.      •Tiene dolor, hinchazón o enrojecimiento nuevos en un brazo o cyn pierna o se produce un empeoramiento de alguno de estos síntomas.      •Tiene entumecimiento u hormigueo que empeora en un brazo o cyn pierna.      •Tiene moretones fuera de lo común.        Solicite ayuda de inmediato si:  •Tiene signos o síntomas de que un coágulo de isrrael se ha movido a los pulmones. Estos incluyen:  •Falta de aire.      •Dolor de pecho.      •Latidos cardíacos acelerados o irregulares (palpitaciones).      •Mareos o aturdimiento.      •Tos con isrrael.      •Tiene signos o síntomas de que la isrrael está demasiado anticoagulada. Estos incluyen:  •Isrrael en el vómito, las heces o la orina.      •Un concha que no sebastian de sangrar.      •Período menstrual más abundante que lo normal.      •Dolor de natalie intenso o confusión.        Estos síntomas pueden representar un problema grave que constituye cyn emergencia. No espere a sally si los síntomas desaparecen. Solicite atención médica de inmediato. Comuníquese con el servicio de emergencias de barron localidad (911 en los [place]). No conduzca por chitra propios medios hasta el hospital.       Resumen    •La trombosis venosa profunda (TVP) sucede cuando se forma un coágulo de isrrael en cyn vena profunda. [place] puede ocurrir en la parte inferior de la pierna, el muslo, la pelvis o el brazo.      •Los síntomas afectan el brazo o la pierna, y pueden incluir hinchazón, dolor, sensibilidad, calor, enrojecimiento o cambio de color.      •Esta afección se puede tratar con medicamentos o medias de compresión. En casos graves, paresh vez haya que hacer cyn cirugía.      •Si está recibiendo anticoagulantes, tómelos exactamente radha se lo hayan indicado. No se saltee cyn dosis. No tome más de lo recetado.      •Obtenga ayuda de inmediato si le falta el aire, siente dolor en el pecho, tiene latidos cardíacos acelerados o irregulares, o ve isrrael en el vómito, la orina o las heces.      Esta información no tiene radha fin reemplazar el consejo del médico. Asegúrese de hacerle al médico cualquier pregunta que tenga.

## 2022-06-08 NOTE — ED ADULT NURSE NOTE - NS_SISCREENINGSR_GEN_ALL_ED
Denies nausea and negative for vomiting. Feels better. Normal BM today. Abdomen non-tender. No problem with liquids, will try solids this evening. Does not want GI consult. Negative

## 2022-06-08 NOTE — ED STATDOCS - CARE PROVIDER_API CALL
Beto Rolle)  Surgery; Vascular Surgery  [address] [facility] [facility], [address]st [place] [address]  Phone: [phone]  Fax: [phone]  Follow Up Time:

## 2022-06-08 NOTE — ED STATDOCS - NS ED ATTENDING STATEMENT MOD
This was a shared visit with the KASHMIR. I reviewed and verified the documentation and independently performed the documented:

## 2022-06-08 NOTE — ED STATDOCS - PATIENT PORTAL LINK FT
You can access the FollowMyHealth Patient Portal offered by [facility] by registering at the following website: [email or web address]. By joining [employer]’s FollowMyHealth portal, you will also be able to view your health information using other applications (apps) compatible with our system.

## 2022-06-08 NOTE — ED ADULT NURSE NOTE - NSIMPLEMENTINTERV_GEN_ALL_ED
Implemented All Fall with Harm Risk Interventions:  [place] to call system. Call bell, personal items and telephone within reach. Instruct patient to call for assistance. Room bathroom lighting operational. Non-slip footwear when patient is off stretcher. Physically safe environment: no spills, clutter or unnecessary equipment. Stretcher in lowest position, wheels locked, appropriate side rails in place. Provide visual cue, wrist band, yellow gown, etc. Monitor gait and stability. Monitor for mental status changes and reorient to person, place, and time. Review medications for side effects contributing to fall risk. Reinforce activity limits and safety measures with patient and family. Provide visual clues: red socks.

## 2022-06-08 NOTE — ED STATDOCS - OBJECTIVE STATEMENT
72 yo male w/PMH of prostate CA, arthritis, HTN presents to the ED sent by MD for left leg DVT. Pt c/o left foot swelling. Had prostatectomy on June 17th. Since yesterday with left foot swelling. [facility] Pt not on blood thinners. Denies CP, SOB, abdominal pain or N/V/D.

## 2022-06-08 NOTE — ED STATDOCS - CLINICAL SUMMARY MEDICAL DECISION MAKING FREE TEXT BOX
Reviewed outpatient record, pt with partially occluded thrombus at junction of greater saphenous and common femoral and recent normal creatinine. Will start on anticoagulation and f/u with PMD. Reviewed outpatient record, pt with partially occluded thrombus at junction of greater saphenous and common femoral and recent normal creatinine. Will start on anticoagulation and f/u with PMD.            Reviewed old labs with attending in HIE.  Pt. to be DC with Eliquis 10mg BID x 7 days then 5 mg BID x 5 months.  Will provide vascular referral.  Mia Keys PA-C

## 2022-06-09 ENCOUNTER — NON-APPOINTMENT (OUTPATIENT)
Age: 72
End: 2022-06-09

## 2022-06-13 ENCOUNTER — RX RENEWAL (OUTPATIENT)
Age: 72
End: 2022-06-13

## 2022-06-14 NOTE — ASSESSMENT
[FreeTextEntry1] : Ordered a stat US duplex venous lower extremity to r/o DVT.\par Explained to patient that we will schedule him for an IR procedure at [facility] for a lymphocele drainage with Dr. Lee. This was explained to the patient and his family member. \par Answered all questions and addressed all concerns. \par Will contact patient with the results of US. \par \par Repeat PSA in 3 months. \par Next follow-up appointment in 3 months.

## 2022-06-14 NOTE — HISTORY OF PRESENT ILLNESS
[None] : no symptoms [FreeTextEntry1] : 72 yo presents today for a follow-up appointment for prostate cancer. \par RALP 5.19.22.\par CT scan abdomen and pelvis 6.6.22. showed a cystic structure along the right iliac vessels measuring 2.4 x 4.1 cm. Another large cystic structure along the left iliac vessels compression of the adjacent iliac vein, measuring 4.1 cm x 8.6 cm.  Moderate-sized left inguinal hernia containing fluid.\par \par Pt. is also complaining of left lower extremity swelling.

## 2022-06-14 NOTE — PHYSICAL EXAM
[Normal Appearance] : normal appearance [Well Groomed] : well groomed [General Appearance - In No Acute Distress] : no acute distress [Abdomen Soft] : soft [Urethral Meatus] : meatus normal [Skin Color & Pigmentation] : normal skin color and pigmentation [Edema] : no peripheral edema [] : no respiratory distress [Exaggerated Use Of Accessory Muscles For Inspiration] : no accessory muscle use [Oriented To Time, Place, And Person] : oriented to person, place, and time [Affect] : the affect was normal [No Focal Deficits] : no focal deficits [No Palpable Adenopathy] : no palpable adenopathy [FreeTextEntry1] : left lower extremity edema noted.

## 2022-06-15 NOTE — ASSESSMENT
[FreeTextEntry1] : Surgical pathology report:\par GS 7 (4+3)\par Perineural invasion\par EPE\par + seminal vesicles \par Negative lymph nodes: 0/9 right, 0/5 left\par Focal involvement inked margins\par Suspected epididymitis. Cipro 500 mg PO x 7 days ordered.\par CT scan abdomen & pelvis w/wo IV contrast ordered to confirm hernia and to check lower pelvis/scrotal region.\par Rx ibuprofen 600 mg sent to pharmacy.\par PSA in 1 month.\par Next follow-up appointment in 1 month.

## 2022-06-15 NOTE — PHYSICAL EXAM
[Normal Appearance] : normal appearance [Well Groomed] : well groomed [General Appearance - In No Acute Distress] : no acute distress [Abdomen Soft] : soft [Urethral Meatus] : meatus normal [Skin Color & Pigmentation] : normal skin color and pigmentation [Edema] : no peripheral edema [] : no respiratory distress [Oriented To Time, Place, And Person] : oriented to person, place, and time [Affect] : the affect was normal [Normal Station and Gait] : the gait and station were normal for the patient's age [No Focal Deficits] : no focal deficits [No Palpable Adenopathy] : no palpable adenopathy [FreeTextEntry1] : epididymitis suspected.

## 2022-06-15 NOTE — HISTORY OF PRESENT ILLNESS
[None] : no symptoms [FreeTextEntry1] : 72 yo presents today for a POA s/p RALP 5.19.22.\par Pt. doing well since his surgery. \par Good appetite and having regular bowel movements.\par Here to discuss surgical pathology report.\par No complications after surgery.

## 2022-07-14 ENCOUNTER — APPOINTMENT (OUTPATIENT)
Dept: VASCULAR SURGERY | Facility: CLINIC | Age: 72
End: 2022-07-14

## 2022-07-28 ENCOUNTER — APPOINTMENT (OUTPATIENT)
Dept: VASCULAR SURGERY | Facility: CLINIC | Age: 72
End: 2022-07-28

## 2022-07-28 VITALS — SYSTOLIC BLOOD PRESSURE: 125 MMHG | DIASTOLIC BLOOD PRESSURE: 80 MMHG | OXYGEN SATURATION: 97 % | HEART RATE: 77 BPM

## 2022-07-28 DIAGNOSIS — I82.409 ACUTE EMBOLISM AND THROMBOSIS OF UNSPECIFIED DEEP VEINS OF UNSPECIFIED LOWER EXTREMITY: ICD-10-CM

## 2022-07-28 PROCEDURE — 99204 OFFICE O/P NEW MOD 45 MIN: CPT

## 2022-07-28 PROCEDURE — 93971 EXTREMITY STUDY: CPT | Mod: LT

## 2022-07-28 RX ORDER — ATORVASTATIN CALCIUM 10 MG/1
10 TABLET, FILM COATED ORAL
Qty: 90 | Refills: 0 | Status: ACTIVE | COMMUNITY
Start: 2022-07-14

## 2022-07-28 RX ORDER — LOSARTAN POTASSIUM 50 MG/1
50 TABLET, FILM COATED ORAL
Qty: 90 | Refills: 0 | Status: ACTIVE | COMMUNITY
Start: 2022-07-14

## 2022-07-28 RX ORDER — AMLODIPINE BESYLATE 5 MG/1
5 TABLET ORAL
Qty: 90 | Refills: 0 | Status: ACTIVE | COMMUNITY
Start: 2022-07-14

## 2022-07-28 NOTE — ASSESSMENT
[FreeTextEntry1] : 70 yo M with history of HTN and prostate cancer presenting for evaluation of LLE DVT at [facility]/ L common femoral vein. Patient has been on Eliquis since diagnosis. \par \par Venous duplex demonstrates no evidence of DVT, SVT or VI [Arterial/Venous Disease] : arterial/venous disease [Medication Management] : medication management

## 2022-07-28 NOTE — HISTORY OF PRESENT ILLNESS
[FreeTextEntry1] : 70 yo M with history of HTN and prostate cancer presenting for evaluation of LLE DVT at [facility]/ L common femoral vein. Patient has been on Eliquis since diagnosis. Denies pain or swelling in the lower extremities.

## 2022-07-28 NOTE — PHYSICAL EXAM
[Normal Rate and Rhythm] : normal rate and rhythm [2+] : left 2+ [Ankle Swelling (On Exam)] : not present [Varicose Veins Of Lower Extremities] : not present [] : not present [Abdomen Tenderness] : ~T ~M No abdominal tenderness [No Rash or Lesion] : No rash or lesion [Alert] : alert [Oriented to Person] : oriented to person [Oriented to Place] : oriented to place [Oriented to Time] : oriented to time [Calm] : calm [de-identified] : NAD [de-identified] : supple, no masses [de-identified] : unlabored breathing [de-identified] : FROM of all 4 extremities\par

## 2022-08-26 NOTE — ED ADULT TRIAGE NOTE - HEIGHT IN FEET
[address] [facility], [place]  PROCEDURE NOTE    Name:  Sd Prakash  MR#:  [id number]  :  1965  ACCOUNT #:  [de-identified]  DATE OF SERVICE:  2022    PREOPERATIVE DIAGNOSIS:  Family history of colon cancer. POSTOPERATIVE DIAGNOSES:  1. Family history of colon cancer. 2.  Normal exam.    PROCEDURE PERFORMED:  Colonoscopy. SURGEON:  Tomasz Paez. Alston Saint., MD    ASSISTANT:  None. ANESTHESIA:  MAC.    COMPLICATIONS:  None. SPECIMENS REMOVED:  None. IMPLANTS:  None. ESTIMATED BLOOD LOSS:  Minimal.    COUNTS:  Correct. PROCEDURE:  After the patient was brought into the room, a time-out was carried out and all were in agreement. He was rolled onto his left side and MAC anesthesia administered. Scope was introduced into the rectum and under direct visualization, was passed to the level of the cecum. Slow withdrawal of over 7 minutes was done. No abnormal findings were noted throughout the entire exam.  As much air as possible was evacuated prior to removal.  Scope removed. The patient tolerated the procedure well.       Stanislav Harding MD      [id number]  D:  2022 14:47  T:  2022 0:20  JOB #:  [id number]
5

## 2022-09-05 ENCOUNTER — NON-APPOINTMENT (OUTPATIENT)
Age: 72
End: 2022-09-05

## 2022-09-06 ENCOUNTER — NON-APPOINTMENT (OUTPATIENT)
Age: 72
End: 2022-09-06

## 2022-09-08 ENCOUNTER — OUTPATIENT (OUTPATIENT)
Dept: INPATIENT UNIT | Facility: HOSPITAL | Age: 72
LOS: 1 days | Discharge: HOME | End: 2022-09-08
Payer: MEDICARE

## 2022-09-08 ENCOUNTER — RESULT REVIEW (OUTPATIENT)
Age: 72
End: 2022-09-08

## 2022-09-08 VITALS
TEMPERATURE: 99 F | RESPIRATION RATE: 17 BRPM | OXYGEN SATURATION: 100 % | SYSTOLIC BLOOD PRESSURE: 143 MMHG | HEART RATE: 56 BPM | DIASTOLIC BLOOD PRESSURE: 75 MMHG

## 2022-09-08 VITALS
HEIGHT: 66 IN | OXYGEN SATURATION: 100 % | RESPIRATION RATE: 16 BRPM | TEMPERATURE: 98 F | WEIGHT: 190.04 LBS | HEART RATE: 63 BPM | DIASTOLIC BLOOD PRESSURE: 88 MMHG | SYSTOLIC BLOOD PRESSURE: 157 MMHG

## 2022-09-08 DIAGNOSIS — Z98.890 OTHER SPECIFIED POSTPROCEDURAL STATES: Chronic | ICD-10-CM

## 2022-09-08 DIAGNOSIS — I89.8 OTHER SPECIFIED NONINFECTIVE DISORDERS OF LYMPHATIC VESSELS AND LYMPH NODES: ICD-10-CM

## 2022-09-08 DIAGNOSIS — K65.1 PERITONEAL ABSCESS: ICD-10-CM

## 2022-09-08 LAB
ANION GAP SERPL CALC-SCNC: 5 MMOL/L — SIGNIFICANT CHANGE UP (ref 5–17)
BUN SERPL-MCNC: 7 MG/DL — SIGNIFICANT CHANGE UP (ref 7–23)
CALCIUM SERPL-MCNC: 9 MG/DL — SIGNIFICANT CHANGE UP (ref 8.5–10.1)
CHLORIDE SERPL-SCNC: 109 MMOL/L — HIGH (ref 96–108)
CO2 SERPL-SCNC: 26 MMOL/L — SIGNIFICANT CHANGE UP (ref 22–31)
CREAT SERPL-MCNC: 0.82 MG/DL — SIGNIFICANT CHANGE UP (ref 0.5–1.3)
EGFR: 94 ML/MIN/1.73M2 — SIGNIFICANT CHANGE UP
GLUCOSE SERPL-MCNC: 96 MG/DL — SIGNIFICANT CHANGE UP (ref 70–99)
HCT VFR BLD CALC: 39.8 % — SIGNIFICANT CHANGE UP (ref 39–50)
HGB BLD-MCNC: 13.9 G/DL — SIGNIFICANT CHANGE UP (ref 13–17)
INR BLD: 1.32 RATIO — HIGH (ref 0.88–1.16)
MCHC RBC-ENTMCNC: 28.4 PG — SIGNIFICANT CHANGE UP (ref 27–34)
MCHC RBC-ENTMCNC: 34.9 GM/DL — SIGNIFICANT CHANGE UP (ref 32–36)
MCV RBC AUTO: 81.2 FL — SIGNIFICANT CHANGE UP (ref 80–100)
PLATELET # BLD AUTO: 157 K/UL — SIGNIFICANT CHANGE UP (ref 150–400)
POTASSIUM SERPL-MCNC: 4 MMOL/L — SIGNIFICANT CHANGE UP (ref 3.5–5.3)
POTASSIUM SERPL-SCNC: 4 MMOL/L — SIGNIFICANT CHANGE UP (ref 3.5–5.3)
PROTHROM AB SERPL-ACNC: 15.3 SEC — HIGH (ref 10.5–13.4)
RBC # BLD: 4.9 M/UL — SIGNIFICANT CHANGE UP (ref 4.2–5.8)
RBC # FLD: 12.7 % — SIGNIFICANT CHANGE UP (ref 10.3–14.5)
SODIUM SERPL-SCNC: 140 MMOL/L — SIGNIFICANT CHANGE UP (ref 135–145)
WBC # BLD: 6.07 K/UL — SIGNIFICANT CHANGE UP (ref 3.8–10.5)
WBC # FLD AUTO: 6.07 K/UL — SIGNIFICANT CHANGE UP (ref 3.8–10.5)

## 2022-09-08 PROCEDURE — 85027 COMPLETE CBC AUTOMATED: CPT

## 2022-09-08 PROCEDURE — 36415 COLL VENOUS BLD VENIPUNCTURE: CPT

## 2022-09-08 PROCEDURE — 93005 ELECTROCARDIOGRAM TRACING: CPT

## 2022-09-08 PROCEDURE — 76380 CAT SCAN FOLLOW-UP STUDY: CPT

## 2022-09-08 PROCEDURE — 76380 CAT SCAN FOLLOW-UP STUDY: CPT | Mod: 26

## 2022-09-08 PROCEDURE — C1769: CPT

## 2022-09-08 PROCEDURE — 93010 ELECTROCARDIOGRAM REPORT: CPT

## 2022-09-08 PROCEDURE — 85610 PROTHROMBIN TIME: CPT

## 2022-09-08 PROCEDURE — 80048 BASIC METABOLIC PNL TOTAL CA: CPT

## 2022-09-08 PROCEDURE — C1729: CPT

## 2022-09-08 RX ORDER — AMLODIPINE BESYLATE 2.5 MG/1
1 TABLET ORAL
Qty: 0 | Refills: 0 | DISCHARGE

## 2022-09-08 RX ORDER — LOSARTAN POTASSIUM 100 MG/1
1 TABLET, FILM COATED ORAL
Qty: 0 | Refills: 0 | DISCHARGE

## 2022-09-08 NOTE — ASU PATIENT PROFILE, ADULT - NSICDXPASTMEDICALHX_GEN_ALL_CORE_FT
PAST MEDICAL HISTORY:  Arthritis     At risk for obstructive sleep apnea CANDI precautions. Pt >3 criteria on STOP-Bang questionnaire.    HTN (hypertension)     Leg DVT (deep venous thromboembolism), acute left    Prostate CA

## 2022-09-28 PROBLEM — I82.409 ACUTE EMBOLISM AND THROMBOSIS OF UNSPECIFIED DEEP VEINS OF UNSPECIFIED LOWER EXTREMITY: Chronic | Status: ACTIVE | Noted: 2022-09-08

## 2022-10-03 LAB — PSA SERPL-MCNC: 0.03 NG/ML

## 2022-10-12 ENCOUNTER — APPOINTMENT (OUTPATIENT)
Dept: UROLOGY | Facility: CLINIC | Age: 72
End: 2022-10-12

## 2022-10-12 VITALS
BODY MASS INDEX: 29.73 KG/M2 | HEIGHT: 66 IN | SYSTOLIC BLOOD PRESSURE: 146 MMHG | OXYGEN SATURATION: 97 % | WEIGHT: 185 LBS | HEART RATE: 80 BPM | DIASTOLIC BLOOD PRESSURE: 86 MMHG

## 2022-10-12 PROCEDURE — 99213 OFFICE O/P EST LOW 20 MIN: CPT

## 2022-10-20 NOTE — ASSESSMENT
[FreeTextEntry1] : PSA 0.03.\par Continue PSA surveillance. Explained that the desired pattern should be a continued decrease to being undetectable for his PSA. \par Encouraged to continue Kegel exercises.\par Repeat PSA in 3 months.\par Next follow-up appointment in 3 months.\par answered all questions and addressed all concerns.\par Pt. is doing much better.

## 2022-10-20 NOTE — HISTORY OF PRESENT ILLNESS
[FreeTextEntry1] : 70 yo presents today for a follow-up appointment for prostate cancer. \par s/p RALP 5.19.22.\par Recent PSA 0.03 (10.1.22.)\par Pt. is doing well. Has good urinary control and strong stream.\par Denies wearing any pads. \par No erections since surgery and has no interest in starting medications for ED at this time.\par

## 2023-01-17 LAB — PSA SERPL-MCNC: 0.09 NG/ML

## 2023-01-18 ENCOUNTER — APPOINTMENT (OUTPATIENT)
Dept: UROLOGY | Facility: CLINIC | Age: 73
End: 2023-01-18

## 2023-02-01 ENCOUNTER — NON-APPOINTMENT (OUTPATIENT)
Age: 73
End: 2023-02-01

## 2023-02-01 ENCOUNTER — APPOINTMENT (OUTPATIENT)
Dept: UROLOGY | Facility: CLINIC | Age: 73
End: 2023-02-01
Payer: MEDICARE

## 2023-02-01 PROCEDURE — 99213 OFFICE O/P EST LOW 20 MIN: CPT

## 2023-02-01 RX ORDER — SILDENAFIL 20 MG/1
20 TABLET ORAL
Qty: 90 | Refills: 1 | Status: ACTIVE | COMMUNITY
Start: 2023-02-01 | End: 1900-01-01

## 2023-02-01 NOTE — HISTORY OF PRESENT ILLNESS
[FreeTextEntry1] : Patient is a 72 year old male who present for follow up. He is status post RALP on  5/19/2022 . \par Pathology : T3 b , GG3 , cribriform present. EPE +,  perineural invasion : present \par He is doing well with good urinary control . He has not tried PDE5 yet. \par PSA:\par 1/13/2023: 0.09\par 10/1/2022: 0.03\par Pre RALP : 22\par

## 2023-02-01 NOTE — ASSESSMENT
[FreeTextEntry1] : Patient is a 72 year old male who present for follow up. He is status post RALP on  5/19/2022 . \par Pathology : T3 b , GG3 , cribriform present. EPE +,  perineural invasion : present \par He is doing well with good urinary control . He has not tried PDE5 yet. \par Discussed PSA results

## 2023-02-12 NOTE — HISTORY OF PRESENT ILLNESS
[FreeTextEntry1] : 70 yo presents today for a follow-up appointment for prostate cancer. \par s/p RALP 5.19.22.\par Recent PSA 0.09 (1.13.23.)\par Previous PSA 0.03 (10.1.22.)\par Pt. is doing well. Has good urinary control and strong stream.\par

## 2023-02-12 NOTE — ASSESSMENT
[FreeTextEntry1] : PSA 0.09\par No intervention needed at this time. Explained to patient that if his PSA continues to rise, we will need to refer him to radiation oncology. \par Continue PSA surveillance.\par Repeat PSA in 3 months.\par Next follow-up appointment in 3 months. \par

## 2023-04-25 LAB — PSA SERPL-MCNC: 0.14 NG/ML

## 2023-05-03 ENCOUNTER — APPOINTMENT (OUTPATIENT)
Dept: UROLOGY | Facility: CLINIC | Age: 73
End: 2023-05-03
Payer: MEDICARE

## 2023-05-03 PROCEDURE — 99213 OFFICE O/P EST LOW 20 MIN: CPT

## 2023-05-04 NOTE — PHYSICAL EXAM
[Normal Appearance] : normal appearance [Well Groomed] : well groomed [General Appearance - In No Acute Distress] : no acute distress [Abdomen Soft] : soft [Urethral Meatus] : meatus normal [Skin Color & Pigmentation] : normal skin color and pigmentation [] : no respiratory distress [Edema] : no peripheral edema [Oriented To Time, Place, And Person] : oriented to person, place, and time [Affect] : the affect was normal [Normal Station and Gait] : the gait and station were normal for the patient's age [No Focal Deficits] : no focal deficits [No Palpable Adenopathy] : no palpable adenopathy

## 2023-05-06 NOTE — HISTORY OF PRESENT ILLNESS
[None] : no symptoms [FreeTextEntry1] : 72 yo presents today for a follow-up appointment for prostate cancer. \par s/p RALP 5.19.22\par Recent PSA 0.14 (4.25.23.)\par PSA 0.09 (1.13.23.)\par PSA 0.03 (10.1.22.)\par Pt. is doing well. Has good urinary control and strong stream.\par

## 2023-05-06 NOTE — ASSESSMENT
[FreeTextEntry1] : Discussed the steady incline of his PSA level. \par PSA 0.14. \par Referred to radiation oncology, Dr. Maile Calhoun. \par Contact information and phone number given to patient at the time of visit to schedule an initial consultation.\par Answered all questions and addressed all concerns. \par Repeat PSA in 4 months or as per Dr. Calhoun's plan of care.\par Next follow-up visit in 4 months/ or after radiation is completed. \par

## 2023-05-19 ENCOUNTER — APPOINTMENT (OUTPATIENT)
Dept: RADIATION ONCOLOGY | Facility: CLINIC | Age: 73
End: 2023-05-19
Payer: MEDICARE

## 2023-05-19 VITALS
HEIGHT: 66 IN | HEART RATE: 77 BPM | WEIGHT: 189 LBS | RESPIRATION RATE: 20 BRPM | SYSTOLIC BLOOD PRESSURE: 118 MMHG | OXYGEN SATURATION: 98 % | BODY MASS INDEX: 30.37 KG/M2 | DIASTOLIC BLOOD PRESSURE: 72 MMHG

## 2023-05-19 PROCEDURE — 99204 OFFICE O/P NEW MOD 45 MIN: CPT

## 2023-05-19 RX ORDER — OXYCODONE 5 MG/1
5 TABLET ORAL
Qty: 12 | Refills: 0 | Status: DISCONTINUED | COMMUNITY
Start: 2022-05-20 | End: 2023-05-19

## 2023-05-19 RX ORDER — IBUPROFEN 600 MG/1
600 TABLET, FILM COATED ORAL 3 TIMES DAILY
Qty: 21 | Refills: 1 | Status: DISCONTINUED | COMMUNITY
Start: 2022-06-01 | End: 2023-05-19

## 2023-05-19 RX ORDER — DIAZEPAM 5 MG/1
5 TABLET ORAL
Qty: 1 | Refills: 0 | Status: DISCONTINUED | COMMUNITY
Start: 2022-02-10 | End: 2023-05-19

## 2023-05-19 RX ORDER — CIPROFLOXACIN HYDROCHLORIDE 500 MG/1
500 TABLET, FILM COATED ORAL
Qty: 20 | Refills: 0 | Status: DISCONTINUED | COMMUNITY
Start: 2021-11-02 | End: 2023-05-19

## 2023-05-19 RX ORDER — ENEMA 19; 7 G/133ML; G/133ML
7-19 ENEMA RECTAL
Qty: 1 | Refills: 0 | Status: DISCONTINUED | COMMUNITY
Start: 2022-02-10 | End: 2023-05-19

## 2023-05-19 RX ORDER — APIXABAN 5 MG/1
5 TABLET, FILM COATED ORAL
Qty: 60 | Refills: 2 | Status: DISCONTINUED | COMMUNITY
Start: 2022-06-13 | End: 2023-05-19

## 2023-05-19 RX ORDER — CIPROFLOXACIN HYDROCHLORIDE 500 MG/1
500 TABLET, FILM COATED ORAL TWICE DAILY
Qty: 14 | Refills: 0 | Status: DISCONTINUED | COMMUNITY
Start: 2022-06-01 | End: 2023-05-19

## 2023-05-19 NOTE — HISTORY OF PRESENT ILLNESS
[FreeTextEntry1] : The patient is a pleasant 72 year old male diagnosed with prostate cancer s/p RP May 2022 with rising PSA.\par \par He initially presented with PSA elevation to 19 ng/ml and was referred to Dr. Hardy in November, 2021.\par \par MRI prostate  on 1/6/22 showed 4.7 x 4.2 x 5.7 cm. Volume 59 cc. 3 lesions were identified with left sided extracapsular extension and neurovascular bundle involvement. PI RADS 5. No SV invasion or pelvic lymphadenopathy. \par \par Biopsy 2/16/22 demonstrated adenocarcinoma in 9/15 cores, [place] 3+4=7, 4+3 =7. 3+3=6\par Bone scan 4/13/22 negative for metastases.\par \par 5/19/22 Radical prostatectomy RALP by Dr. Mcknight. Pathology showed Acinar Adenocarcinoma [place] score 4+3=7 and Grade group 3 and 2. Minor tertiary pattern present, IDC not identified. Cribriform glands present. EPE present, Left seminal vesicle invasion present. Perineural Invasion present. Invasive carcinoma  present at margins- left posterior. All regional LN negative 0/14. \par \par PSA trending:\par 11/2021= 22.10\par RP 5/19/2022\par 10/1/22 = 0.03\par 1/13/23=  0.09\par 4/25/23 = 0.14\par \par Urinary frequency and nocturia 3-4 x noted. Denies hematuria and dysphagia.  He reports that he had a screening colonoscopy in 2020 which was negative.  He reports daily bowel movements and denies hemorrhoids he is single and lives with his daughter and grandchildren in [place].  His granddaughter Tenisha is a nursing assistant at [employer] ([employer]). He presents today for consultation to discuss the role of radiation therapy in his care.\par

## 2023-05-19 NOTE — VITALS
[Maximal Pain Intensity: 0/10] : 0/10 [90: Able to carry normal activity; minor signs or symptoms of disease.] : 90: Able to carry normal activity; minor signs or symptoms of disease.  [Date: ____________] : Patient's last distress assessment performed on [unfilled]. [Patient Refusal] : Patient refused psychosocial distress assessment

## 2023-05-19 NOTE — DISEASE MANAGEMENT
[10-20] : 10 - 20 ng/mL [IIIB] : IIIB [Radical Prostatectomy] : Radical Prostatectomy [Patient had a radical prostatectomy] : Patient had a radical prostatectomy  [7(4+3)] : Fab Score 7(4+3) [Positive] : Positive margins [3] : T3 [b] : b [0] : M0 [RadicalProstatectomyDate] : 05/22 [TotalNumberofnodesresected] : 14 [PositiveNodes] : 0

## 2023-05-19 NOTE — REVIEW OF SYSTEMS
[IPSS Score (0-40): ___] : IPSS score: [unfilled] [Hematuria: Grade 0] : Hematuria: Grade 0 [Urinary Incontinence: Grade 0] : Urinary Incontinence: Grade 0  [Urinary Tract Pain: Grade 0] : Urinary Tract Pain: Grade 0 [Urinary Urgency: Grade 0] : Urinary Urgency: Grade 0 [Urinary Frequency: Grade 1 - Present] : Urinary Frequency: Grade 1 - Present [Urinary Retention: Grade 0] : Urinary Retention: Grade 0

## 2023-05-22 ENCOUNTER — OUTPATIENT (OUTPATIENT)
Dept: OUTPATIENT SERVICES | Facility: HOSPITAL | Age: 73
LOS: 1 days | Discharge: HOME | End: 2023-05-22

## 2023-05-22 DIAGNOSIS — C61 MALIGNANT NEOPLASM OF PROSTATE: ICD-10-CM

## 2023-05-22 DIAGNOSIS — Z98.890 OTHER SPECIFIED POSTPROCEDURAL STATES: Chronic | ICD-10-CM

## 2023-05-23 ENCOUNTER — APPOINTMENT (OUTPATIENT)
Dept: HEMATOLOGY ONCOLOGY | Facility: CLINIC | Age: 73
End: 2023-05-23
Payer: MEDICARE

## 2023-05-23 VITALS
SYSTOLIC BLOOD PRESSURE: 129 MMHG | OXYGEN SATURATION: 96 % | BODY MASS INDEX: 30.25 KG/M2 | DIASTOLIC BLOOD PRESSURE: 79 MMHG | RESPIRATION RATE: 18 BRPM | TEMPERATURE: 98.3 F | WEIGHT: 187.44 LBS | HEART RATE: 78 BPM

## 2023-05-23 DIAGNOSIS — C61 MALIGNANT NEOPLASM OF PROSTATE: ICD-10-CM

## 2023-05-23 PROCEDURE — 99203 OFFICE O/P NEW LOW 30 MIN: CPT

## 2023-05-23 NOTE — HISTORY OF PRESENT ILLNESS
[de-identified] : This 72-year-old male was referred by Dr. Calhoun for the hormonal therapy of prostate cancer.\par \par He initially presented with PSA elevation to 19 ng/ml and was referred to Dr. Hardy in November, 2021.\par \par MRI prostate on 1/6/22 showed 4.7 x 4.2 x 5.7 cm. Volume 59 cc. 3 lesions were identified with left sided extracapsular extension and neurovascular bundle involvement. PI RADS 5. No SV invasion or pelvic lymphadenopathy. \par \par Biopsy 2/16/22 demonstrated adenocarcinoma in 9/15 cores, [place] 3+4=7, 4+3 =7. 3+3=6\par Bone scan 4/13/22 negative for metastases.\par \par 5/19/22 Radical prostatectomy RALP by Dr. Mcknight. Pathology showed Acinar Adenocarcinoma [place] score 4+3=7 and Grade group 3 and 2. Minor tertiary pattern present, IDC not identified. Cribriform glands present. EPE present, Left seminal vesicle invasion present. Perineural Invasion present. Invasive carcinoma present at margins- left posterior. All regional LN negative 0/14. \par \par PSA trending:\par 11/2021= 22.10\par RP 5/19/2022\par 10/1/22 = 0.03\par 1/13/23= 0.09\par 4/25/23 = 0.14\par \par Dr. Calhoun is planning a course of postoperative radiation therapy in this gentleman with high risk extracapsular tumor extension, positive surgical margins, left seminal vesicle invasion, and a persistent postoperative rise in PSA.\par \par \par

## 2023-05-23 NOTE — REASON FOR VISIT
[Initial Consultation] : an initial consultation [Family Member] : family member [FreeTextEntry2] : Prostate cancer

## 2023-07-28 ENCOUNTER — APPOINTMENT (OUTPATIENT)
Dept: NUCLEAR MEDICINE | Facility: CLINIC | Age: 73
End: 2023-07-28
Payer: MEDICARE

## 2023-07-28 ENCOUNTER — OUTPATIENT (OUTPATIENT)
Dept: OUTPATIENT SERVICES | Facility: HOSPITAL | Age: 73
LOS: 1 days | End: 2023-07-28
Payer: MEDICARE

## 2023-07-28 DIAGNOSIS — Z98.890 OTHER SPECIFIED POSTPROCEDURAL STATES: Chronic | ICD-10-CM

## 2023-07-28 DIAGNOSIS — R97.20 ELEVATED PROSTATE SPECIFIC ANTIGEN [PSA]: ICD-10-CM

## 2023-07-28 DIAGNOSIS — C61 MALIGNANT NEOPLASM OF PROSTATE: ICD-10-CM

## 2023-07-28 PROCEDURE — 78816 PET IMAGE W/CT FULL BODY: CPT | Mod: 26

## 2023-07-28 PROCEDURE — 78816 PET IMAGE W/CT FULL BODY: CPT

## 2023-07-28 PROCEDURE — A9595: CPT

## 2023-09-13 ENCOUNTER — APPOINTMENT (OUTPATIENT)
Dept: UROLOGY | Facility: CLINIC | Age: 73
End: 2023-09-13

## 2023-10-23 NOTE — HISTORY OF PRESENT ILLNESS
[de-identified] : The patient comes in today for his bilateral knees.  He states he is feeling much better. Female

## 2024-06-12 ENCOUNTER — APPOINTMENT (OUTPATIENT)
Dept: ORTHOPEDIC SURGERY | Facility: CLINIC | Age: 74
End: 2024-06-12
Payer: MEDICARE

## 2024-06-12 VITALS
BODY MASS INDEX: 28.99 KG/M2 | HEART RATE: 82 BPM | HEIGHT: 65 IN | DIASTOLIC BLOOD PRESSURE: 73 MMHG | WEIGHT: 174 LBS | SYSTOLIC BLOOD PRESSURE: 148 MMHG

## 2024-06-12 DIAGNOSIS — Z80.9 FAMILY HISTORY OF MALIGNANT NEOPLASM, UNSPECIFIED: ICD-10-CM

## 2024-06-12 DIAGNOSIS — Z85.46 PERSONAL HISTORY OF MALIGNANT NEOPLASM OF PROSTATE: ICD-10-CM

## 2024-06-12 DIAGNOSIS — M17.0 BILATERAL PRIMARY OSTEOARTHRITIS OF KNEE: ICD-10-CM

## 2024-06-12 PROCEDURE — 73560 X-RAY EXAM OF KNEE 1 OR 2: CPT | Mod: 50

## 2024-06-12 PROCEDURE — 73565 X-RAY EXAM OF KNEES: CPT | Mod: 59

## 2024-06-12 PROCEDURE — 99213 OFFICE O/P EST LOW 20 MIN: CPT

## 2024-06-13 DIAGNOSIS — M79.641 PAIN IN RIGHT HAND: ICD-10-CM

## 2024-06-14 ENCOUNTER — APPOINTMENT (OUTPATIENT)
Dept: ORTHOPEDIC SURGERY | Facility: CLINIC | Age: 74
End: 2024-06-14
Payer: MEDICARE

## 2024-06-14 VITALS
HEIGHT: 65 IN | WEIGHT: 174 LBS | DIASTOLIC BLOOD PRESSURE: 69 MMHG | SYSTOLIC BLOOD PRESSURE: 116 MMHG | BODY MASS INDEX: 28.99 KG/M2 | HEART RATE: 87 BPM

## 2024-06-14 DIAGNOSIS — M79.646 PAIN IN UNSPECIFIED HAND: ICD-10-CM

## 2024-06-14 DIAGNOSIS — R22.32 LOCALIZED SWELLING, MASS AND LUMP, LEFT UPPER LIMB: ICD-10-CM

## 2024-06-14 DIAGNOSIS — M79.643 PAIN IN UNSPECIFIED HAND: ICD-10-CM

## 2024-06-14 PROCEDURE — 73130 X-RAY EXAM OF HAND: CPT | Mod: 50

## 2024-06-14 PROCEDURE — 99215 OFFICE O/P EST HI 40 MIN: CPT

## 2024-06-14 NOTE — PHYSICAL EXAM
[de-identified] : [4] views of [bilateral hands and wrists] were obtained today in my office and were seen by me and discussed with the patient.  These [show findings consistent with bilateral basal joint OA and findings of IP joint OA] [de-identified] : Examination of the left middle finger reveals a 6 cm or more in diameter mass which wraps around the dorsum of the middle phalanx overlying PIP around laterally and into the base of the finger and into the hand.  It is lobulated large in size and deep

## 2024-06-14 NOTE — HISTORY OF PRESENT ILLNESS
[FreeTextEntry1] : Sir is a very pleasant 73-year-old male Nepalese-speaking who is here with his daughter who helped with history and translating.  He presents with a chronic history of left middle finger pain and an enlarging mass.

## 2024-06-14 NOTE — ASSESSMENT
[FreeTextEntry1] : ASSESSMENT: [The patient was accompanied today and was assisted with explaining their complaints today.] The patient comes in today with chronic worsening enlarging mass of the left hand and middle finger.  With this in mind we have discussed benign and/or malignant potential.  We have discussed risk of recurrence.  At this point in time treatment modalities discussed the patient would like to proceed with excisional biopsy of this mass. It is significantly large.  We have discussed possibility of finger amputation. Surgical discussion  We had a thorough discussion regarding her current symptoms and complaints.  She has a growing mass that she wishes to be removed.  We discussed removal of the mass versus observing it.  At this point she would like for it to be removed.  I would remove it but also I mentioned sending it to the pathologist.  We talked about benign versus malignant lesions.  At this point we also talked about the need for amputation and other limb salvage surgeries if this turns out to be a malignant lesion.   We talked about recurrence of the mass as a possibility.  We talked about the need for secondary surgery.    Surgical Consent Discussion:  I explained the risks and benefits of surgical intervention to the patient. The risks include, but are not limited to: pain, infection, swelling, stiffness, injury to underlying neurovascular structures, scar sensitivity, incomplete resolution of symptoms, the possibility of the need for future surgery and finally the need to comply with a post-operative occupational therapy protocol. The patient understands, agrees and informed consent was obtained. The patients surgery will be scheduled in the near future.     The patient was adequately and thoroughly informed of my assessment of their current condition(s).  - This may diminish bodily function for the extremity. We discussed prognosis, tx modalities including operative and nonoperative options for the above diagnostic assessment. As always, 2nd opinion is always provided as an option.  When accessible, I was able to review other physicians note(s) including reviewing other tests, imaging results as well as personally view these results for my own interpretation.   The patient was adequately and thoroughly informed of my assessment of their current condition(s).  DISCUSSION: 1.  He elects to proceed with left middle finger and left hand mass excisional biopsy 2. [x] 3. [x]

## 2024-06-17 PROBLEM — M17.0 BILATERAL PRIMARY OSTEOARTHRITIS OF KNEE: Status: ACTIVE | Noted: 2021-06-24

## 2024-06-17 PROBLEM — Z85.46 HISTORY OF MALIGNANT NEOPLASM OF PROSTATE: Status: RESOLVED | Noted: 2024-06-13 | Resolved: 2024-06-17

## 2024-06-17 PROBLEM — Z80.9 FAMILY HISTORY OF MALIGNANT NEOPLASM: Status: ACTIVE | Noted: 2024-06-13

## 2024-06-17 NOTE — PHYSICAL EXAM
[de-identified] : Right Knee: Range of Motion in Degrees                                     Claimant:        Normal: Flexion Active              110                135-degrees Flexion Passive           110                135-degrees Extension Active            10                0-5-degrees Extension Passive         10                0-5-degrees   No weakness to flexion/extension.  No evidence of instability in the AP plane on varus or valgus stress.  Negative Lachman.  Negative pivot shift.  Negative anterior drawer test.  Negative posterior drawer test.  Negative Ita.  Negative Apley grind.  No medial or lateral joint line tenderness.  Positive tenderness over the medial and lateral facet of the patella.  Positive patellofemoral crepitations.  No lateral tilting patella.  No patella apprehension.  Positive crepitation in the medial and lateral femoral condyle.  No proximal or distal swelling, edema or tenderness.  No gross motor or sensory deficits.  Mild intra-articular swelling.  2+ DP and PT pulses.  No varus or valgus malalignment.  Skin is intact.  No rashes, scars or lesions.   Left Knee: Range of Motion in Degrees                                   Claimant:    Normal: Flexion Active             95              135-degrees Flexion Passive          95              135-degrees Extension Active        10               0-5-degrees Extension Passive     10               0-5-degrees   No weakness to flexion/extension.  No evidence of instability in the AP plane on varus or valgus stress.  Negative Lachman.  Negative pivot shift.  Negative anterior drawer test.  Negative posterior drawer test.  Negative Ita.  Negative Apley grind.  No medial or lateral joint line tenderness.  Positive tenderness over the medial and lateral facet of the patella.  Positive patellofemoral crepitations.  No lateral tilting patella.  No patella apprehension.  Positive crepitation in the medial and lateral femoral condyle.  No proximal or distal swelling, edema or tenderness.  No gross motor or sensory deficits.  Mild intra-articular swelling.  2+ DP and PT pulses.  No varus or valgus malalignment.  Skin is intact.  No rashes, scars or lesions.   [de-identified] : Gait and Station:  Ambulating with a slightly antalgic to antalgic gait.  Normal Station.  [de-identified] : Appearance:  Well-developed, well-nourished male in no acute distress.   [de-identified] : Radiographs, two views of the right knee taken in the office today, show severe arthritis. Radiographs, two views of the left knee taken in the office today, show severe arthritis. Radiograph, one view AP standing of bilateral knees taken in the office today, shows severe arthritis.

## 2024-06-17 NOTE — ADDENDUM
[FreeTextEntry1] : This note was written by Selvin Reyez on 06/17/2024, acting as a scribe for Cipriano Lebron III, MD

## 2024-06-17 NOTE — DISCUSSION/SUMMARY
[de-identified] : At this time, due to osteoarthritis of bilateral knees, I had a long discussion with the patient concerning operative and nonoperative modalities.  At this point, we will start him on therapy.  He will be reassessed in two weeks.  They want to think about proceeding with surgery.

## 2024-06-17 NOTE — HISTORY OF PRESENT ILLNESS
[de-identified] : The patient comes in today with increasing complaints of pain to his bilateral knees (left worse than right).  This injury is not work related or due to an automobile accident.  The patient states the pain is constant and localized.  The patient describes the pain as sharp and cramping.   [8] : a current pain level of 8/10 [de-identified] : Bending [de-identified] : Ice

## 2024-07-01 ENCOUNTER — RESULT REVIEW (OUTPATIENT)
Age: 74
End: 2024-07-01

## 2024-07-01 ENCOUNTER — APPOINTMENT (OUTPATIENT)
Dept: ORTHOPEDIC SURGERY | Facility: AMBULATORY SURGERY CENTER | Age: 74
End: 2024-07-01

## 2024-07-01 PROCEDURE — 26118 RAD RESECT HAND TUMOR 3 CM/>: CPT | Mod: F2

## 2024-07-01 PROCEDURE — 64702 REVISE FINGER/TOE NERVE: CPT | Mod: LT

## 2024-07-08 ENCOUNTER — APPOINTMENT (OUTPATIENT)
Dept: ORTHOPEDIC SURGERY | Facility: CLINIC | Age: 74
End: 2024-07-08

## 2024-07-11 ENCOUNTER — APPOINTMENT (OUTPATIENT)
Dept: ORTHOPEDIC SURGERY | Facility: CLINIC | Age: 74
End: 2024-07-11
Payer: MEDICARE

## 2024-07-11 VITALS
WEIGHT: 174 LBS | BODY MASS INDEX: 28.99 KG/M2 | HEIGHT: 65 IN | SYSTOLIC BLOOD PRESSURE: 128 MMHG | HEART RATE: 72 BPM | DIASTOLIC BLOOD PRESSURE: 72 MMHG

## 2024-07-11 DIAGNOSIS — R22.32 LOCALIZED SWELLING, MASS AND LUMP, LEFT UPPER LIMB: ICD-10-CM

## 2024-07-11 DIAGNOSIS — M79.646 PAIN IN UNSPECIFIED HAND: ICD-10-CM

## 2024-07-11 DIAGNOSIS — M79.643 PAIN IN UNSPECIFIED HAND: ICD-10-CM

## 2024-07-11 PROCEDURE — 99024 POSTOP FOLLOW-UP VISIT: CPT

## 2024-07-11 RX ORDER — SULFAMETHOXAZOLE AND TRIMETHOPRIM 800; 160 MG/1; MG/1
800-160 TABLET ORAL TWICE DAILY
Qty: 14 | Refills: 0 | Status: ACTIVE | COMMUNITY
Start: 2024-07-11 | End: 1900-01-01

## 2024-07-18 ENCOUNTER — NON-APPOINTMENT (OUTPATIENT)
Age: 74
End: 2024-07-18

## 2024-07-18 ENCOUNTER — APPOINTMENT (OUTPATIENT)
Dept: ORTHOPEDIC SURGERY | Facility: CLINIC | Age: 74
End: 2024-07-18
Payer: MEDICARE

## 2024-07-18 VITALS — BODY MASS INDEX: 28.82 KG/M2 | WEIGHT: 173 LBS | HEIGHT: 65 IN

## 2024-07-18 DIAGNOSIS — M17.0 BILATERAL PRIMARY OSTEOARTHRITIS OF KNEE: ICD-10-CM

## 2024-07-18 PROCEDURE — 20610 DRAIN/INJ JOINT/BURSA W/O US: CPT | Mod: 50

## 2024-07-18 PROCEDURE — 99214 OFFICE O/P EST MOD 30 MIN: CPT | Mod: 25

## 2024-07-18 RX ORDER — MELOXICAM 15 MG/1
15 TABLET ORAL DAILY
Qty: 30 | Refills: 1 | Status: ACTIVE | COMMUNITY
Start: 2024-07-18 | End: 1900-01-01

## 2024-07-18 RX ORDER — HYALURONATE SODIUM 16.8MG/2ML
16.8 SYRINGE (ML) INTRAARTICULAR
Qty: 6 | Refills: 0 | Status: ACTIVE | OUTPATIENT
Start: 2024-07-18

## 2024-07-29 RX ORDER — HYALURONATE SODIUM 20 MG/2 ML
20 SYRINGE (ML) INTRAARTICULAR
Qty: 2 | Refills: 0 | Status: ACTIVE | OUTPATIENT
Start: 2024-07-29

## 2024-08-06 ENCOUNTER — APPOINTMENT (OUTPATIENT)
Dept: ORTHOPEDIC SURGERY | Facility: CLINIC | Age: 74
End: 2024-08-06

## 2024-08-06 PROCEDURE — 99214 OFFICE O/P EST MOD 30 MIN: CPT | Mod: 25

## 2024-08-06 PROCEDURE — 20610 DRAIN/INJ JOINT/BURSA W/O US: CPT | Mod: 50

## 2024-08-06 NOTE — PHYSICAL EXAM
[de-identified] : Bilateral lower extremity Hip: Normal ROM without pain on IR/ER Knee Inspection: Minimal effusion Wounds: None Alignment: Neutral Palpation: Tender palpation lateral joint line on right ROM active (in degrees): 5-85 right, 5-85 left with crepitus/pain through the arc of motion Ligamentous laxity: all ligaments appear stable, stable to varus stress test, stable to valgus stress test Muscle Test: good quad strength. [de-identified] : 7/18/24: Bilateral knee xrays at Dr. Lebron's office reviewed and demonstrate diffuse tricompartmental degenerative arthritis, medial joint space narrowing, marginal osteophytes, bone on bone with sclerosis, patellofemoral joint space narrowing

## 2024-08-06 NOTE — HISTORY OF PRESENT ILLNESS
[de-identified] : 8/6/24: Patient here for follow-up bilateral knee pain.  Translation through family members per her preference.  States the steroid injection has helped.  Meloxicam is also helpful.  Would like to try the gel injections prior to surgery.  7/18/24: Patient presents with bilateral knee pain left worse than right for 4 years.  Translation through family members per preference.  Feels it is getting worse.  Pain is diffuse in the knees.  Does have locking locking clicking.  Does feel the pain is interfering with his life.  Had a steroid injection 3 years ago which did work well.  Is doing physical therapy right now but does not feel it is helping.  Denies allergies anticoagulation although history of DVT in 2021, denies tobacco use, PMH-hypertension.

## 2024-08-06 NOTE — PHYSICAL EXAM
[de-identified] : Bilateral lower extremity Hip: Normal ROM without pain on IR/ER Knee Inspection: Minimal effusion Wounds: None Alignment: Neutral Palpation: Tender palpation lateral joint line on right ROM active (in degrees): 5-85 right, 5-85 left with crepitus/pain through the arc of motion Ligamentous laxity: all ligaments appear stable, stable to varus stress test, stable to valgus stress test Muscle Test: good quad strength. [de-identified] : 7/18/24: Bilateral knee xrays at Dr. Lebron's office reviewed and demonstrate diffuse tricompartmental degenerative arthritis, medial joint space narrowing, marginal osteophytes, bone on bone with sclerosis, patellofemoral joint space narrowing

## 2024-08-06 NOTE — HISTORY OF PRESENT ILLNESS
[de-identified] : 8/6/24: Patient here for follow-up bilateral knee pain.  Translation through family members per her preference.  States the steroid injection has helped.  Meloxicam is also helpful.  Would like to try the gel injections prior to surgery.  7/18/24: Patient presents with bilateral knee pain left worse than right for 4 years.  Translation through family members per preference.  Feels it is getting worse.  Pain is diffuse in the knees.  Does have locking locking clicking.  Does feel the pain is interfering with his life.  Had a steroid injection 3 years ago which did work well.  Is doing physical therapy right now but does not feel it is helping.  Denies allergies anticoagulation although history of DVT in 2021, denies tobacco use, PMH-hypertension.

## 2024-08-06 NOTE — DISCUSSION/SUMMARY
[de-identified] : Severe bilateral knee osteoarthritis  Extensive discussion of the natural history of this disease was had with the patient.  We discussed the treatment options ranging from conservative therapy which includes anti-inflammatories, steroid/HA injections, physical therapy, weight loss, knee sleeves/braces, and activity modification.  We did discuss that the ultimate treatment for arthritis is a joint replacement.  However at this time we have decided to continue with conservative treatment.   Continue meloxicam. Patient elected for a gel injection of bilateral knees today. We did discuss he must hold off on surgery for at least 3 months after any injection. Patient will return for the next in the series.  The patient's current medication management of their orthopedic diagnosis was reviewed today: (1) We discussed a comprehensive treatment plan that included possible pharmaceutical management involving the use of prescription strength medications including but not limited to options such as oral Ibuprofen 400mg QID, once daily Meloxicam 15 mg, or 500-650 mg Tylenol versus over the counter oral medications and topical prescription NSAID Pennsaid vs over the counter Voltaren gel. (2) There is a moderate risk of morbidity with further treatment, especially from use of prescription strength medications and possible side effects of these medications which include upset stomach with oral medications, skin reactions to topical medications and cardiac/renal issues with long term use. (3) I recommended that the patient follow-up with their medical physician to discuss any significant specific potential issues with long term medication use such as interactions with current medications or with exacerbation of underlying medical comorbidities. (4) The benefits and risks associated with use of injectable, oral or topical, prescription and over the counter anti-inflammatory medications were discussed with the patient. The patient voiced understanding of the risks including but not limited to bleeding, stroke, kidney dysfunction, heart disease, and were referred to the black box warning label for further information.

## 2024-08-06 NOTE — DISCUSSION/SUMMARY
[de-identified] : Severe bilateral knee osteoarthritis  Extensive discussion of the natural history of this disease was had with the patient.  We discussed the treatment options ranging from conservative therapy which includes anti-inflammatories, steroid/HA injections, physical therapy, weight loss, knee sleeves/braces, and activity modification.  We did discuss that the ultimate treatment for arthritis is a joint replacement.  However at this time we have decided to continue with conservative treatment.   Continue meloxicam. Patient elected for a gel injection of bilateral knees today. We did discuss he must hold off on surgery for at least 3 months after any injection. Patient will return for the next in the series.  The patient's current medication management of their orthopedic diagnosis was reviewed today: (1) We discussed a comprehensive treatment plan that included possible pharmaceutical management involving the use of prescription strength medications including but not limited to options such as oral Ibuprofen 400mg QID, once daily Meloxicam 15 mg, or 500-650 mg Tylenol versus over the counter oral medications and topical prescription NSAID Pennsaid vs over the counter Voltaren gel. (2) There is a moderate risk of morbidity with further treatment, especially from use of prescription strength medications and possible side effects of these medications which include upset stomach with oral medications, skin reactions to topical medications and cardiac/renal issues with long term use. (3) I recommended that the patient follow-up with their medical physician to discuss any significant specific potential issues with long term medication use such as interactions with current medications or with exacerbation of underlying medical comorbidities. (4) The benefits and risks associated with use of injectable, oral or topical, prescription and over the counter anti-inflammatory medications were discussed with the patient. The patient voiced understanding of the risks including but not limited to bleeding, stroke, kidney dysfunction, heart disease, and were referred to the black box warning label for further information.

## 2024-08-06 NOTE — PROCEDURE
[de-identified] : 8/6/24: Bilateral knee Injection -Euflexxa The risks, benefits, and alternatives of the injection were reviewed/discussed with the patient today in office and all of their questions were answered.  We discussed possible side effects and efficacy of this injection.  The patient consented to the procedure. Site and side were verified with the patient.  Prior to injection, a Time Out was conducted in accordance with [facility] policy and the site and nature of procedure verified with the patient. The inferolateral injection site was prepped with chloroprep.  Cold spray was utilized to numb the skin. Utilizing sterile technique, the knee was injected with hyaluronic acid. A sterile bandage was applied. The patient tolerated the procedure well and there were no complications. Lot: [id number] a Exp: 5/6/2025 7/18/24: Bilateral knee injection - Steroid (Methylprednisolone)

## 2024-08-06 NOTE — PROCEDURE
[de-identified] : 8/6/24: Bilateral knee Injection -Euflexxa The risks, benefits, and alternatives of the injection were reviewed/discussed with the patient today in office and all of their questions were answered.  We discussed possible side effects and efficacy of this injection.  The patient consented to the procedure. Site and side were verified with the patient.  Prior to injection, a Time Out was conducted in accordance with [facility] policy and the site and nature of procedure verified with the patient. The inferolateral injection site was prepped with chloroprep.  Cold spray was utilized to numb the skin. Utilizing sterile technique, the knee was injected with hyaluronic acid. A sterile bandage was applied. The patient tolerated the procedure well and there were no complications. Lot: [id number] a Exp: 5/6/2025 7/18/24: Bilateral knee injection - Steroid (Methylprednisolone)

## 2024-08-12 ENCOUNTER — APPOINTMENT (OUTPATIENT)
Dept: ORTHOPEDIC SURGERY | Facility: CLINIC | Age: 74
End: 2024-08-12
Payer: MEDICARE

## 2024-08-12 ENCOUNTER — APPOINTMENT (OUTPATIENT)
Dept: MRI IMAGING | Facility: CLINIC | Age: 74
End: 2024-08-12
Payer: MEDICARE

## 2024-08-12 DIAGNOSIS — M17.0 BILATERAL PRIMARY OSTEOARTHRITIS OF KNEE: ICD-10-CM

## 2024-08-12 PROCEDURE — 74183 MRI ABD W/O CNTR FLWD CNTR: CPT | Mod: 26

## 2024-08-12 PROCEDURE — 20610 DRAIN/INJ JOINT/BURSA W/O US: CPT | Mod: 50

## 2024-08-12 NOTE — PROCEDURE
[de-identified] : 8/12/24: Bilateral knee Injection -Euflexxa The risks, benefits, and alternatives of the injection were reviewed/discussed with the patient today in office and all of their questions were answered. We discussed possible side effects and efficacy of this injection. The patient consented to the procedure. Site and side were verified with the patient. Prior to injection, a Time Out was conducted in accordance with [facility] policy and the site and nature of procedure verified with the patient. The inferolateral injection site was prepped with chloroprep. Cold spray was utilized to numb the skin. Utilizing sterile technique, the knee was injected with hyaluronic acid. A sterile bandage was applied. The patient tolerated the procedure well and there were no complications. Lot: [id number] a Exp: 5/6/2025 8/6/24: Bilateral knee Injection -Euflexxa 7/18/24: Bilateral knee injection - Steroid (Methylprednisolone).

## 2024-08-12 NOTE — PROCEDURE
[de-identified] : 8/12/24: Bilateral knee Injection -Euflexxa The risks, benefits, and alternatives of the injection were reviewed/discussed with the patient today in office and all of their questions were answered. We discussed possible side effects and efficacy of this injection. The patient consented to the procedure. Site and side were verified with the patient. Prior to injection, a Time Out was conducted in accordance with [facility] policy and the site and nature of procedure verified with the patient. The inferolateral injection site was prepped with chloroprep. Cold spray was utilized to numb the skin. Utilizing sterile technique, the knee was injected with hyaluronic acid. A sterile bandage was applied. The patient tolerated the procedure well and there were no complications. Lot: [id number] a Exp: 5/6/2025 8/6/24: Bilateral knee Injection -Euflexxa 7/18/24: Bilateral knee injection - Steroid (Methylprednisolone).

## 2024-08-20 ENCOUNTER — APPOINTMENT (OUTPATIENT)
Dept: ORTHOPEDIC SURGERY | Facility: CLINIC | Age: 74
End: 2024-08-20
Payer: MEDICARE

## 2024-08-20 DIAGNOSIS — R22.32 LOCALIZED SWELLING, MASS AND LUMP, LEFT UPPER LIMB: ICD-10-CM

## 2024-08-20 PROCEDURE — 99024 POSTOP FOLLOW-UP VISIT: CPT

## 2024-08-20 NOTE — HISTORY OF PRESENT ILLNESS
[de-identified] : s/p left middle finger and left hand mass excisional biopsy DOS: 07/01/24. [de-identified] : Patient returns for follow-up. [de-identified] : The incisions are healing beautifully.  He has excellent range of motion and minimal scar tissue [de-identified] : We are both delighted with the results.  He will continue with occupational therapy to continue enhancing his outcome [de-identified] : 1.  Follow-up 2 months.  Continue OT

## 2024-08-27 ENCOUNTER — APPOINTMENT (OUTPATIENT)
Dept: ORTHOPEDIC SURGERY | Facility: CLINIC | Age: 74
End: 2024-08-27

## 2024-08-27 DIAGNOSIS — M17.0 BILATERAL PRIMARY OSTEOARTHRITIS OF KNEE: ICD-10-CM

## 2024-08-27 PROCEDURE — 20610 DRAIN/INJ JOINT/BURSA W/O US: CPT | Mod: 50,79

## 2024-08-27 NOTE — PROCEDURE
[de-identified] : 8/27/24: Bilateral knee Injection -Euflexxa The risks, benefits, and alternatives of the injection were reviewed/discussed with the patient today in office and all of their questions were answered. We discussed possible side effects and efficacy of this injection. The patient consented to the procedure. Site and side were verified with the patient. Prior to injection, a Time Out was conducted in accordance with [facility] policy and the site and nature of procedure verified with the patient. The inferolateral injection site was prepped with chloroprep. Cold spray was utilized to numb the skin. Utilizing sterile technique, the knee was injected with hyaluronic acid. A sterile bandage was applied. The patient tolerated the procedure well and there were no complications. Lot: [id number] a Exp: 5/6/2025 8/6/24: Bilateral knee Injection -Euflexxa 7/18/24: Bilateral knee injection - Steroid (Methylprednisolone).

## 2024-10-22 ENCOUNTER — APPOINTMENT (OUTPATIENT)
Dept: ORTHOPEDIC SURGERY | Facility: CLINIC | Age: 74
End: 2024-10-22
Payer: MEDICARE

## 2024-10-22 DIAGNOSIS — R22.32 LOCALIZED SWELLING, MASS AND LUMP, LEFT UPPER LIMB: ICD-10-CM

## 2024-10-22 DIAGNOSIS — M79.643 PAIN IN UNSPECIFIED HAND: ICD-10-CM

## 2024-10-22 DIAGNOSIS — M79.646 PAIN IN UNSPECIFIED HAND: ICD-10-CM

## 2024-10-22 PROCEDURE — 99213 OFFICE O/P EST LOW 20 MIN: CPT

## 2025-04-30 NOTE — ED ADULT NURSE NOTE - NS ED NOTE ABUSE SUSPICION NEGLECT YN
Patient's INR- 2.3, within therapeutic range. Continue current dosage and recheck as contracted. CheriN  
No

## 2025-07-02 ENCOUNTER — APPOINTMENT (OUTPATIENT)
Dept: UROLOGY | Facility: CLINIC | Age: 75
End: 2025-07-02
Payer: MEDICARE

## 2025-07-02 VITALS
BODY MASS INDEX: 30.22 KG/M2 | DIASTOLIC BLOOD PRESSURE: 90 MMHG | OXYGEN SATURATION: 96 % | RESPIRATION RATE: 16 BRPM | WEIGHT: 188 LBS | HEIGHT: 66 IN | HEART RATE: 79 BPM | SYSTOLIC BLOOD PRESSURE: 148 MMHG

## 2025-07-02 PROCEDURE — G2211 COMPLEX E/M VISIT ADD ON: CPT

## 2025-07-02 PROCEDURE — 99214 OFFICE O/P EST MOD 30 MIN: CPT

## 2025-07-03 ENCOUNTER — NON-APPOINTMENT (OUTPATIENT)
Age: 75
End: 2025-07-03

## 2025-07-03 LAB — PSA SERPL-MCNC: 8.31 NG/ML

## 2025-07-16 ENCOUNTER — APPOINTMENT (OUTPATIENT)
Dept: RADIATION ONCOLOGY | Facility: CLINIC | Age: 75
End: 2025-07-16

## 2025-07-16 VITALS
RESPIRATION RATE: 16 BRPM | DIASTOLIC BLOOD PRESSURE: 78 MMHG | SYSTOLIC BLOOD PRESSURE: 131 MMHG | HEIGHT: 66 IN | BODY MASS INDEX: 30.86 KG/M2 | HEART RATE: 77 BPM | WEIGHT: 192 LBS | OXYGEN SATURATION: 98 %

## 2025-07-16 PROCEDURE — 99213 OFFICE O/P EST LOW 20 MIN: CPT

## 2025-08-12 ENCOUNTER — OUTPATIENT (OUTPATIENT)
Dept: OUTPATIENT SERVICES | Facility: HOSPITAL | Age: 75
LOS: 1 days | End: 2025-08-12
Payer: COMMERCIAL

## 2025-08-12 ENCOUNTER — APPOINTMENT (OUTPATIENT)
Dept: NUCLEAR MEDICINE | Facility: CLINIC | Age: 75
End: 2025-08-12
Payer: MEDICARE

## 2025-08-12 DIAGNOSIS — C61 MALIGNANT NEOPLASM OF PROSTATE: ICD-10-CM

## 2025-08-12 DIAGNOSIS — Z98.890 OTHER SPECIFIED POSTPROCEDURAL STATES: Chronic | ICD-10-CM

## 2025-08-12 PROCEDURE — 78816 PET IMAGE W/CT FULL BODY: CPT

## 2025-08-12 PROCEDURE — A9608: CPT

## 2025-08-12 PROCEDURE — 78816 PET IMAGE W/CT FULL BODY: CPT | Mod: 26

## 2025-08-25 ENCOUNTER — APPOINTMENT (OUTPATIENT)
Dept: RADIATION ONCOLOGY | Facility: CLINIC | Age: 75
End: 2025-08-25
Payer: MEDICARE

## 2025-08-25 VITALS
SYSTOLIC BLOOD PRESSURE: 151 MMHG | OXYGEN SATURATION: 98 % | DIASTOLIC BLOOD PRESSURE: 87 MMHG | WEIGHT: 194 LBS | HEIGHT: 66 IN | RESPIRATION RATE: 16 BRPM | BODY MASS INDEX: 31.18 KG/M2 | HEART RATE: 66 BPM

## 2025-08-25 PROCEDURE — 99214 OFFICE O/P EST MOD 30 MIN: CPT

## 2025-08-25 RX ORDER — BICALUTAMIDE 50 MG/1
50 TABLET ORAL
Qty: 30 | Refills: 0 | Status: ACTIVE | COMMUNITY
Start: 2025-08-25 | End: 1900-01-01

## 2025-08-25 RX ORDER — TAMSULOSIN HYDROCHLORIDE 0.4 MG/1
0.4 CAPSULE ORAL
Qty: 90 | Refills: 3 | Status: ACTIVE | COMMUNITY
Start: 2025-08-25 | End: 1900-01-01

## 2025-09-02 ENCOUNTER — APPOINTMENT (OUTPATIENT)
Dept: HEMATOLOGY ONCOLOGY | Facility: CLINIC | Age: 75
End: 2025-09-02
Payer: MEDICARE

## 2025-09-02 ENCOUNTER — RESULT REVIEW (OUTPATIENT)
Age: 75
End: 2025-09-02

## 2025-09-02 VITALS
HEART RATE: 66 BPM | OXYGEN SATURATION: 97 % | DIASTOLIC BLOOD PRESSURE: 87 MMHG | WEIGHT: 195 LBS | TEMPERATURE: 98.4 F | SYSTOLIC BLOOD PRESSURE: 152 MMHG | BODY MASS INDEX: 31.47 KG/M2

## 2025-09-02 DIAGNOSIS — Z91.89 OTHER SPECIFIED PERSONAL RISK FACTORS, NOT ELSEWHERE CLASSIFIED: ICD-10-CM

## 2025-09-02 DIAGNOSIS — C61 MALIGNANT NEOPLASM OF PROSTATE: ICD-10-CM

## 2025-09-02 PROCEDURE — G2211 COMPLEX E/M VISIT ADD ON: CPT

## 2025-09-02 PROCEDURE — 99214 OFFICE O/P EST MOD 30 MIN: CPT

## 2025-09-03 ENCOUNTER — APPOINTMENT (OUTPATIENT)
Dept: RADIOLOGY | Facility: CLINIC | Age: 75
End: 2025-09-03
Payer: MEDICARE

## 2025-09-03 ENCOUNTER — OUTPATIENT (OUTPATIENT)
Dept: OUTPATIENT SERVICES | Facility: HOSPITAL | Age: 75
LOS: 1 days | End: 2025-09-03
Payer: MEDICARE

## 2025-09-03 DIAGNOSIS — C61 MALIGNANT NEOPLASM OF PROSTATE: ICD-10-CM

## 2025-09-03 DIAGNOSIS — Z91.89 OTHER SPECIFIED PERSONAL RISK FACTORS, NOT ELSEWHERE CLASSIFIED: ICD-10-CM

## 2025-09-03 DIAGNOSIS — Z98.890 OTHER SPECIFIED POSTPROCEDURAL STATES: Chronic | ICD-10-CM

## 2025-09-03 LAB
25(OH)D3 SERPL-MCNC: 49.4 NG/ML
ALBUMIN SERPL ELPH-MCNC: 4.6 G/DL
ALP BLD-CCNC: 99 U/L
ALT SERPL-CCNC: 52 U/L
ANION GAP SERPL CALC-SCNC: 16 MMOL/L
AST SERPL-CCNC: 36 U/L
BILIRUB SERPL-MCNC: 0.4 MG/DL
BUN SERPL-MCNC: 11 MG/DL
CALCIUM SERPL-MCNC: 9.6 MG/DL
CHLORIDE SERPL-SCNC: 103 MMOL/L
CO2 SERPL-SCNC: 21 MMOL/L
CREAT SERPL-MCNC: 0.73 MG/DL
EGFRCR SERPLBLD CKD-EPI 2021: 95 ML/MIN/1.73M2
FERRITIN SERPL-MCNC: 27 NG/ML
FOLATE SERPL-MCNC: >20 NG/ML
GLUCOSE SERPL-MCNC: 118 MG/DL
MAGNESIUM SERPL-MCNC: 2.1 MG/DL
PHOSPHATE SERPL-MCNC: 4.1 MG/DL
POTASSIUM SERPL-SCNC: 4.4 MMOL/L
PROT SERPL-MCNC: 7.5 G/DL
PSA FREE FLD-MCNC: 4 %
PSA FREE SERPL-MCNC: 0.26 NG/ML
PSA SERPL-MCNC: 6.22 NG/ML
SODIUM SERPL-SCNC: 140 MMOL/L
TESTOST FREE SERPL-MCNC: 6.5 PG/ML
TESTOST SERPL-MCNC: 383 NG/DL
VIT B12 SERPL-MCNC: 323 PG/ML

## 2025-09-03 PROCEDURE — 77080 DXA BONE DENSITY AXIAL: CPT

## 2025-09-03 PROCEDURE — 77080 DXA BONE DENSITY AXIAL: CPT | Mod: 26

## 2025-09-16 ENCOUNTER — APPOINTMENT (OUTPATIENT)
Dept: INFUSION THERAPY | Facility: CLINIC | Age: 75
End: 2025-09-16